# Patient Record
Sex: FEMALE | Race: WHITE | Employment: UNEMPLOYED | URBAN - METROPOLITAN AREA
[De-identification: names, ages, dates, MRNs, and addresses within clinical notes are randomized per-mention and may not be internally consistent; named-entity substitution may affect disease eponyms.]

---

## 2022-03-18 PROBLEM — Z3A.38 38 WEEKS GESTATION OF PREGNANCY: Status: ACTIVE | Noted: 2018-09-13

## 2022-03-19 PROBLEM — O43.193 MARGINAL INSERTION OF UMBILICAL CORD AFFECTING MANAGEMENT OF MOTHER IN THIRD TRIMESTER: Status: ACTIVE | Noted: 2018-05-31

## 2022-03-19 PROBLEM — O09.93 HIGH-RISK PREGNANCY IN THIRD TRIMESTER: Status: ACTIVE | Noted: 2018-05-01

## 2022-03-19 PROBLEM — O40.9XX0 POLYHYDRAMNIOS AFFECTING PREGNANCY: Status: ACTIVE | Noted: 2018-06-21

## 2022-03-19 PROBLEM — O09.899 MATERNAL VARICELLA, NON-IMMUNE: Status: ACTIVE | Noted: 2018-03-30

## 2022-03-19 PROBLEM — O30.003 TWIN GESTATION IN THIRD TRIMESTER: Status: ACTIVE | Noted: 2018-09-13

## 2022-03-19 PROBLEM — O30.043 DICHORIONIC DIAMNIOTIC TWIN PREGNANCY IN THIRD TRIMESTER: Status: ACTIVE | Noted: 2018-04-21

## 2022-03-19 PROBLEM — Z28.39 MATERNAL VARICELLA, NON-IMMUNE: Status: ACTIVE | Noted: 2018-03-30

## 2022-03-20 PROBLEM — A74.9 CHLAMYDIA INFECTION: Status: ACTIVE | Noted: 2018-04-06

## 2022-06-20 PROBLEM — Z3A.38 38 WEEKS GESTATION OF PREGNANCY: Status: RESOLVED | Noted: 2018-09-13 | Resolved: 2022-06-20

## 2022-06-20 PROBLEM — O09.899 MATERNAL VARICELLA, NON-IMMUNE: Status: RESOLVED | Noted: 2018-03-30 | Resolved: 2022-06-20

## 2022-06-20 PROBLEM — Z34.90 PREGNANCY: Status: ACTIVE | Noted: 2022-06-20

## 2022-06-20 PROBLEM — Z87.59 HISTORY OF TWIN PREGNANCY IN PRIOR PREGNANCY: Status: ACTIVE | Noted: 2022-06-20

## 2022-06-20 PROBLEM — O43.193 MARGINAL INSERTION OF UMBILICAL CORD AFFECTING MANAGEMENT OF MOTHER IN THIRD TRIMESTER: Status: RESOLVED | Noted: 2018-05-31 | Resolved: 2022-06-20

## 2022-06-20 PROBLEM — O40.9XX0 POLYHYDRAMNIOS AFFECTING PREGNANCY: Status: RESOLVED | Noted: 2018-06-21 | Resolved: 2022-06-20

## 2022-06-20 PROBLEM — O09.93 HIGH-RISK PREGNANCY IN THIRD TRIMESTER: Status: RESOLVED | Noted: 2018-05-01 | Resolved: 2022-06-20

## 2022-06-20 PROBLEM — O30.043 DICHORIONIC DIAMNIOTIC TWIN PREGNANCY IN THIRD TRIMESTER: Status: RESOLVED | Noted: 2018-04-21 | Resolved: 2022-06-20

## 2022-06-20 PROBLEM — Z98.891 HISTORY OF C-SECTION: Status: ACTIVE | Noted: 2022-06-20

## 2022-06-20 PROBLEM — Z28.39 MATERNAL VARICELLA, NON-IMMUNE: Status: RESOLVED | Noted: 2018-03-30 | Resolved: 2022-06-20

## 2022-06-20 PROBLEM — O99.210 OBESITY AFFECTING PREGNANCY: Status: ACTIVE | Noted: 2022-06-20

## 2022-06-20 PROBLEM — A74.9 CHLAMYDIA INFECTION: Status: RESOLVED | Noted: 2018-04-06 | Resolved: 2022-06-20

## 2022-06-20 PROBLEM — O30.003 TWIN GESTATION IN THIRD TRIMESTER: Status: RESOLVED | Noted: 2018-09-13 | Resolved: 2022-06-20

## 2022-06-27 PROBLEM — Z01.89 ENCOUNTER FOR LABORATORY TEST: Status: ACTIVE | Noted: 2022-06-27

## 2022-07-05 PROBLEM — Z01.89 ENCOUNTER FOR LABORATORY TEST: Status: RESOLVED | Noted: 2022-06-27 | Resolved: 2022-07-05

## 2022-07-26 PROBLEM — R87.619 ABNORMAL PAP SMEAR OF CERVIX: Status: ACTIVE | Noted: 2022-07-26

## 2022-08-22 ENCOUNTER — NURSE ONLY (OUTPATIENT)
Dept: OBGYN CLINIC | Age: 22
End: 2022-08-22

## 2022-08-22 DIAGNOSIS — Z34.82 PRENATAL CARE, SUBSEQUENT PREGNANCY, SECOND TRIMESTER: ICD-10-CM

## 2022-08-22 DIAGNOSIS — Z3A.18 18 WEEKS GESTATION OF PREGNANCY: ICD-10-CM

## 2022-08-22 DIAGNOSIS — Z13.1 SCREENING FOR DIABETES MELLITUS: ICD-10-CM

## 2022-08-22 LAB — GLUCOSE 1 HOUR: 134 MG/DL

## 2022-09-11 PROBLEM — O09.92 HIGH-RISK PREGNANCY IN SECOND TRIMESTER: Status: ACTIVE | Noted: 2022-06-20

## 2022-09-12 PROBLEM — O99.342 DEPRESSION AFFECTING PREGNANCY IN SECOND TRIMESTER, ANTEPARTUM: Status: ACTIVE | Noted: 2022-09-12

## 2022-10-31 ENCOUNTER — NURSE ONLY (OUTPATIENT)
Dept: OBGYN CLINIC | Age: 22
End: 2022-10-31

## 2022-10-31 ENCOUNTER — ROUTINE PRENATAL (OUTPATIENT)
Dept: OBGYN CLINIC | Age: 22
End: 2022-10-31
Payer: MEDICAID

## 2022-10-31 VITALS — DIASTOLIC BLOOD PRESSURE: 60 MMHG | SYSTOLIC BLOOD PRESSURE: 112 MMHG | BODY MASS INDEX: 42.6 KG/M2 | WEIGHT: 272 LBS

## 2022-10-31 DIAGNOSIS — O99.342 DEPRESSION AFFECTING PREGNANCY IN SECOND TRIMESTER, ANTEPARTUM: ICD-10-CM

## 2022-10-31 DIAGNOSIS — F32.A DEPRESSION AFFECTING PREGNANCY IN SECOND TRIMESTER, ANTEPARTUM: ICD-10-CM

## 2022-10-31 DIAGNOSIS — R73.09 ABNORMAL GTT (GLUCOSE TOLERANCE TEST): ICD-10-CM

## 2022-10-31 DIAGNOSIS — Z13.0 SCREENING FOR IRON DEFICIENCY ANEMIA: ICD-10-CM

## 2022-10-31 DIAGNOSIS — O09.92 HIGH-RISK PREGNANCY IN SECOND TRIMESTER: ICD-10-CM

## 2022-10-31 DIAGNOSIS — Z3A.28 28 WEEKS GESTATION OF PREGNANCY: ICD-10-CM

## 2022-10-31 DIAGNOSIS — O09.92 HIGH-RISK PREGNANCY IN SECOND TRIMESTER: Primary | ICD-10-CM

## 2022-10-31 DIAGNOSIS — Z13.89 SCREENING FOR GENITOURINARY CONDITION: ICD-10-CM

## 2022-10-31 LAB
GESTATIONAL 3HR GTT: NORMAL
GLUCOSE 1H P 100 G GLC PO SERPL-MCNC: 177 MG/DL (ref 65–180)
GLUCOSE 2 HOUR: 151 MG/DL (ref 65–155)
GLUCOSE P FAST SERPL-MCNC: 90 MG/DL (ref 65–95)
GLUCOSE URINE, POC: NEGATIVE
GLUCOSE, 3 HOUR: 133 MG/DL (ref 65–140)
HGB BLD-MCNC: 11.9 G/DL (ref 11.7–15.4)
PROTEIN,URINE, POC: NEGATIVE

## 2022-10-31 PROCEDURE — 99213 OFFICE O/P EST LOW 20 MIN: CPT | Performed by: OBSTETRICS & GYNECOLOGY

## 2022-11-15 ENCOUNTER — HOSPITAL ENCOUNTER (OUTPATIENT)
Age: 22
Discharge: HOME | End: 2022-11-15
Attending: OBSTETRICS & GYNECOLOGY | Admitting: OBSTETRICS & GYNECOLOGY
Payer: MEDICAID

## 2022-11-15 VITALS
DIASTOLIC BLOOD PRESSURE: 56 MMHG | SYSTOLIC BLOOD PRESSURE: 102 MMHG | RESPIRATION RATE: 16 BRPM | OXYGEN SATURATION: 97 % | TEMPERATURE: 98.1 F | HEART RATE: 92 BPM

## 2022-11-15 PROCEDURE — 6370000000 HC RX 637 (ALT 250 FOR IP): Performed by: OBSTETRICS & GYNECOLOGY

## 2022-11-15 PROCEDURE — 99283 EMERGENCY DEPT VISIT LOW MDM: CPT

## 2022-11-15 PROCEDURE — 59025 FETAL NON-STRESS TEST: CPT

## 2022-11-15 RX ORDER — ZOLPIDEM TARTRATE 5 MG/1
5 TABLET ORAL ONCE
Status: COMPLETED | OUTPATIENT
Start: 2022-11-15 | End: 2022-11-15

## 2022-11-15 RX ORDER — ACETAMINOPHEN, ASPIRIN AND CAFFEINE 250; 250; 65 MG/1; MG/1; MG/1
1 TABLET, FILM COATED ORAL EVERY 6 HOURS PRN
COMMUNITY

## 2022-11-15 RX ADMIN — ZOLPIDEM TARTRATE 5 MG: 5 TABLET ORAL at 23:22

## 2022-11-16 NOTE — ED TRIAGE NOTES
OB ED Provider Note    Name: Nadeem Hernandez MRN: [id number]     YOB: 2000  Age: 20 y.o. Sex: female      Subjective:     Reason for Presentation to [facility] ED:  high blood pressure    History of Present Illness: Ms. Aparna Childs is a 20 y.o. [id number] at 30w4d gestation with Estimated Date of Delivery: 23. Her current obstetrical history is significant for  a history of twins delivered by  . Prenatal records reviewed. Took BP at home because she had a mild HA and it was 167/100. However, they just got the BP machine and are unsure how to use it and size of cuff, etc. States HA did not improve with Excedrin. She reports good fetal movement. She denies vaginal bleeding. She denies leakage of fluid. She denies contractions. OB History    Para Term  AB Living   3 1 1 0 1 2   SAB IAB Ectopic Molar Multiple Live Births   1       1 2      # Outcome Date GA Lbr Aleks/2nd Weight Sex Delivery Anes PTL Lv   3 Current            2A Term 18 38w0d  7 lb 4.9 oz (3.315 kg) F CS-LTranv  N TRACIE   2B Term 18 38w0d  6 lb 13.9 oz (3.115 kg) M CS-LTranv  N TRACIE   1 SAB              Past Medical History:   Diagnosis Date    Allergic rhinitis     Chlamydia infection 2018    Depression affecting pregnancy in second trimester, antepartum 2022     Past Surgical History:   Procedure Laterality Date     SECTION      TONSILLECTOMY      WISDOM TOOTH EXTRACTION       Social History     Occupational History    Not on file   Tobacco Use    Smoking status: Never    Smokeless tobacco: Never   Vaping Use    Vaping Use: Never used   Substance and Sexual Activity    Alcohol use: No    Drug use: No    Sexual activity: Yes     Partners: Male     Birth control/protection: Condom        No Known Allergies  Prior to Admission medications    Medication Sig Start Date End Date Taking?  Authorizing Provider   aspirin-acetaminophen-caffeine (EXCEDRIN MIGRAINE) 598-955-12 MG per tablet Take 1 tablet by mouth every 6 hours as needed for Headaches   Yes Historical Provider, MD   sertraline (ZOLOFT) 50 MG tablet Take 1/2 pill everyday for first 6 days then take 1 whole pill everyday thereafter. 9/12/22   Alex Baires MD   aspirin 81 MG EC tablet Take 81 mg by mouth in the morning. Historical Provider, MD   Cholecalciferol (VITAMIN D3) 50 MCG (2000 UT) CAPS Take 2,000 Units by mouth in the morning. Historical Provider, MD   Prenatal-FeFum-FA-DHA w/o A (PRENATAL + DHA PO) Take by mouth    Historical Provider, MD          Objective:     Physical Exam:  VITALS:  BP (!) 102/56   Pulse 92   Temp 98.1 °F (36.7 °C) (Oral)   Resp 16   LMP 04/15/2022   SpO2 97%   BP repeatedly normal here  CONSTITUTIONAL:  awake, alert, cooperative, no apparent distress, and appears stated age  ABDOMEN:  non-tender  FHTs: Reactive and reassuring and Category I strip  Uterine activity/Contractions: None  Membranes: intact  Cervix: exam deferred     Urine dip: negative for protein    Assessment:  30w4d gestation  Normal blood pressures ; Mild headache  Reassuring fetal status      Plan:  Discharge home.  Agrees to try one dose Ambien to help with sleep in order to relieve HA

## 2022-11-16 NOTE — DISCHARGE INSTRUCTIONS
Weeks 26 to 30 of Your Pregnancy: Care Instructions  You're starting your last trimester. Delle Gottron probably feel your baby moving around more. Your back may ache as your body gets used to your baby's size and length. Take care of yourself, and pay attention to what your body needs. Talk to your doctor about getting the Tdap shot. It will help protect your  against whooping cough (pertussis). You may have [place] contractions. They are single or several strong contractions without a pattern. These are practice contractions but not the start of labor. Be kind to yourself. Take breaks when you're tired. Change positions often. Don't sit for too long or stand for too long. At work, rest during breaks if you can. If you don't get breaks, talk to your doctor about writing a letter to your employer to request them. Avoid fumes, chemicals, and tobacco smoke. Be sexual if you want to. You may be interested in sex, or you may not. Everyone is different. Sex is okay unless your doctor tells you not to. Your belly can make it hard to find good positions for sex. [place] and explore. Watch for signs of  labor. These signs include:  Menstrual-like cramps. Or you may have pain or pressure in your pelvis that happens in a pattern. About 6 or more contractions in an hour (even after rest and a glass of water). A low, dull backache that doesn't go away when you change positions. An increase or change in vaginal discharge. Your water breaking. Know what to do if you think you are having contractions. Drink 1 or 2 glasses of water. Lie down on your left side for at least an hour. While on your side, feel the top of your belly to see if it's tight. Write down your contractions for an hour. Time how long it is from the start of one contraction to the start of the next. Call your doctor if you have regular contractions.   Follow-up care is a key part of your treatment and safety. Be sure to make and go to all appointments, and call your doctor if you are having problems. It's also a good idea to know your test results and keep a list of the medicines you take. Where can you learn more? Go to https://chbrent.healthPushpay. org and sign in to your Nutrinia account. Enter A828 in the PagPop box to learn more about \"Weeks 26 to 30 of Your Pregnancy: Care Instructions. \"     If you do not have an account, please click on the \"Sign Up Now\" link. Current as of: February 23, 2022               Content Version: 13.4  © 3702-0976 Healthwise, Incorporated. Care instructions adapted under license by [facility] ([facility]). If you have questions about a medical condition or this instruction, always ask your healthcare professional. [address] any warranty or liability for your use of this information.

## 2022-11-16 NOTE — PROGRESS NOTES
Patient presents to GURU with complaint of headache unrelieved my medication, light headed,  cramping 2 times per hour and patient states that she took her blood pressure with her home cuff that she thinks there is something wrong with it and it was 167/100.

## 2022-11-16 NOTE — PROGRESS NOTES
POC urin dip results:    GLU  100mg/dl  SASHA NEG  KET NEG  SG >= 1030  BLO NEG  PH 6.0  PRO NEG  URO 0.2  NIT NEG  RICO NEG

## 2022-11-16 NOTE — PROGRESS NOTES
Darius Palacios is a 20 y.o. [id number] at 30w5d weeks gestation who is seen for  ER follow up . Pt presented to the ER 11/15/22 w/ elevated BP and Headache. Pt is having cramping today. BP in /56 and was noted to have been repeatedly normal during her evaluation there. Today she notes she still gets headaches and excedrin is minimally helpful    She has no hx PEC or elevated BP in this or prior pregnancy. She does experience some mild cramping throughout the day and some pelvic pressure. No LOF or Ucs. First bp check: 132/84  2nd BP check: 112/80    HISTORY:    OB History    Para Term  AB Living   3 1 1 0 1 2   SAB IAB Ectopic Molar Multiple Live Births   1       1 2      # Outcome Date GA Lbr Aleks/2nd Weight Sex Delivery Anes PTL Lv   3 Current            2A Term 18 38w0d  7 lb 4.9 oz (3.315 kg) F CS-LTranv  N TRACIE   2B Term 18 38w0d  6 lb 13.9 oz (3.115 kg) M CS-LTranv  N TRACIE   1 SAB                Patient's last menstrual period was 04/15/2022. ROS:  Feeling well. No dyspnea or chest pain on exertion. No abdominal pain, change in bowel habits, black or bloody stools. No urinary tract symptoms. OB ROS: No contractions, shortness of breath, vaginal bleeding , and vaginal leaking of fluid . PHYSICAL EXAM:  Blood pressure 112/80, weight 275 lb 6.4 oz (124.9 kg), last menstrual period 04/15/2022. The patient appears well, alert, oriented x 3. Full exam deferred    ASSESSMENT:  Encounter Diagnoses   Name Primary? High-risk pregnancy in third trimester     Screening for genitourinary condition     30 weeks gestation of pregnancy     Pregnancy headache in third trimester Yes       PLAN:  Reassured pt of wnl BP today and in ED during evaluation  Can try magnesium supplement 300mg bid to see if helps with HA  PEC precautions given, will call for re-evaluation prn  Increase water intake. FU as scheduled.     Orders Placed This Encounter   Procedures    AMB POC URINALYSIS DIP STICK AUTO W/O MICRO     Patient reassured. Supervising physician is Dr. Maggi Castellanos. Greater than 50% of this 20 minute visit is spent in counseling to the above topics.

## 2022-11-17 ENCOUNTER — ROUTINE PRENATAL (OUTPATIENT)
Dept: OBGYN CLINIC | Age: 22
End: 2022-11-17
Payer: MEDICAID

## 2022-11-17 VITALS — WEIGHT: 275.4 LBS | DIASTOLIC BLOOD PRESSURE: 80 MMHG | BODY MASS INDEX: 43.13 KG/M2 | SYSTOLIC BLOOD PRESSURE: 112 MMHG

## 2022-11-17 DIAGNOSIS — O09.93 HIGH-RISK PREGNANCY IN THIRD TRIMESTER: ICD-10-CM

## 2022-11-17 DIAGNOSIS — R51.9 PREGNANCY HEADACHE IN THIRD TRIMESTER: Primary | ICD-10-CM

## 2022-11-17 DIAGNOSIS — O26.893 PREGNANCY HEADACHE IN THIRD TRIMESTER: Primary | ICD-10-CM

## 2022-11-17 DIAGNOSIS — Z3A.30 30 WEEKS GESTATION OF PREGNANCY: ICD-10-CM

## 2022-11-17 DIAGNOSIS — Z13.89 SCREENING FOR GENITOURINARY CONDITION: ICD-10-CM

## 2022-11-17 PROBLEM — R00.0 TACHYCARDIA: Status: ACTIVE | Noted: 2022-11-11

## 2022-11-17 PROBLEM — O26.899 ABDOMINAL PAIN AFFECTING PREGNANCY: Status: ACTIVE | Noted: 2022-11-11

## 2022-11-17 PROBLEM — R81 GLUCOSURIA: Status: ACTIVE | Noted: 2022-11-11

## 2022-11-17 PROBLEM — R10.9 ABDOMINAL PAIN AFFECTING PREGNANCY: Status: ACTIVE | Noted: 2022-11-11

## 2022-11-17 LAB
BILIRUBIN, URINE, POC: NEGATIVE
BLOOD URINE, POC: NEGATIVE
GLUCOSE URINE, POC: NORMAL
KETONES, URINE, POC: NEGATIVE
LEUKOCYTE ESTERASE, URINE, POC: NORMAL
NITRITE, URINE, POC: NEGATIVE
PH, URINE, POC: 5.5 (ref 4.6–8)
PROTEIN,URINE, POC: NEGATIVE
SPECIFIC GRAVITY, URINE, POC: 1.03 (ref 1–1.03)
URINALYSIS CLARITY, POC: CLEAR
URINALYSIS COLOR, POC: YELLOW
UROBILINOGEN, POC: NORMAL

## 2022-11-17 PROCEDURE — 99213 OFFICE O/P EST LOW 20 MIN: CPT | Performed by: NURSE PRACTITIONER

## 2022-11-29 ENCOUNTER — ROUTINE PRENATAL (OUTPATIENT)
Dept: OBGYN CLINIC | Age: 22
End: 2022-11-29
Payer: MEDICAID

## 2022-11-29 VITALS — BODY MASS INDEX: 43.42 KG/M2 | SYSTOLIC BLOOD PRESSURE: 116 MMHG | DIASTOLIC BLOOD PRESSURE: 70 MMHG | WEIGHT: 277.2 LBS

## 2022-11-29 DIAGNOSIS — O09.92 HIGH-RISK PREGNANCY IN SECOND TRIMESTER: ICD-10-CM

## 2022-11-29 DIAGNOSIS — Z13.89 SCREENING FOR GENITOURINARY CONDITION: ICD-10-CM

## 2022-11-29 DIAGNOSIS — Z34.83 ENCOUNTER FOR SUPERVISION OF NORMAL PREGNANCY IN MULTIGRAVIDA IN THIRD TRIMESTER: Primary | ICD-10-CM

## 2022-11-29 DIAGNOSIS — Z3A.32 32 WEEKS GESTATION OF PREGNANCY: ICD-10-CM

## 2022-11-29 LAB
GLUCOSE URINE, POC: NORMAL
PROTEIN,URINE, POC: NEGATIVE

## 2022-11-29 PROCEDURE — 90715 TDAP VACCINE 7 YRS/> IM: CPT | Performed by: NURSE PRACTITIONER

## 2022-11-29 PROCEDURE — 99213 OFFICE O/P EST LOW 20 MIN: CPT | Performed by: NURSE PRACTITIONER

## 2022-11-29 PROCEDURE — 90471 IMMUNIZATION ADMIN: CPT | Performed by: NURSE PRACTITIONER

## 2022-12-05 ENCOUNTER — HOSPITAL ENCOUNTER (OUTPATIENT)
Age: 22
Discharge: HOME | End: 2022-12-05
Attending: OBSTETRICS & GYNECOLOGY | Admitting: OBSTETRICS & GYNECOLOGY
Payer: MEDICAID

## 2022-12-05 VITALS
RESPIRATION RATE: 18 BRPM | TEMPERATURE: 97.6 F | OXYGEN SATURATION: 98 % | BODY MASS INDEX: 43.47 KG/M2 | WEIGHT: 277 LBS | HEART RATE: 107 BPM | SYSTOLIC BLOOD PRESSURE: 128 MMHG | HEIGHT: 67 IN | DIASTOLIC BLOOD PRESSURE: 74 MMHG

## 2022-12-05 PROCEDURE — 96372 THER/PROPH/DIAG INJ SC/IM: CPT

## 2022-12-05 PROCEDURE — 99285 EMERGENCY DEPT VISIT HI MDM: CPT

## 2022-12-05 PROCEDURE — 6360000002 HC RX W HCPCS: Performed by: OBSTETRICS & GYNECOLOGY

## 2022-12-05 PROCEDURE — 59025 FETAL NON-STRESS TEST: CPT

## 2022-12-05 PROCEDURE — 6370000000 HC RX 637 (ALT 250 FOR IP): Performed by: OBSTETRICS & GYNECOLOGY

## 2022-12-05 RX ORDER — CYCLOBENZAPRINE HCL 10 MG
10 TABLET ORAL 3 TIMES DAILY PRN
Status: DISCONTINUED | OUTPATIENT
Start: 2022-12-05 | End: 2022-12-06 | Stop reason: HOSPADM

## 2022-12-05 RX ORDER — TERBUTALINE SULFATE 1 MG/ML
0.25 INJECTION, SOLUTION SUBCUTANEOUS ONCE
Status: COMPLETED | OUTPATIENT
Start: 2022-12-05 | End: 2022-12-05

## 2022-12-05 RX ORDER — TERBUTALINE SULFATE 1 MG/ML
INJECTION, SOLUTION SUBCUTANEOUS
Status: DISCONTINUED
Start: 2022-12-05 | End: 2022-12-06 | Stop reason: HOSPADM

## 2022-12-05 RX ADMIN — CYCLOBENZAPRINE 10 MG: 10 TABLET, FILM COATED ORAL at 22:36

## 2022-12-05 RX ADMIN — TERBUTALINE SULFATE 0.25 MG: 1 INJECTION, SOLUTION SUBCUTANEOUS at 23:14

## 2022-12-06 NOTE — PROGRESS NOTES
Pt reports feeling much better. Precautions given. Left ambulatory with sig other. To F/U as previously scheduled.

## 2022-12-06 NOTE — DISCHARGE INSTRUCTIONS
Activity as tolerated  No heavy lifting, pushing or pulling  Rest and sleep on your side  Continue a regular diet with 8-10 glasses of water a day    Call your provider if any of the following:  Excessive vaginal bleeding like a period  Your water breaks  Contractions:           -Greater than 37 weeks: every 4-5 min lasting 40-60 seconds for 1-2 hours          -Less than 37 weeks: 6 or more contractions per hour that do not improve with rest and hydration  4. Decreased fetal movement: your baby should move 10 times in a 2 hour period  5. Severe pain      Weeks 32 to 34 of Your Pregnancy: Care Instructions  Decide whether you want to bank or donate your baby's umbilical cord blood. If you want to save this blood, you have to arrange for it ahead of time. Decide about circumcision. Personal, Mu-ism, or cultural beliefs may play a role in your decision. You get to decide what you want for your baby. Learn how to ease hemorrhoids. Get more liquids, fruits, vegetables, and fiber in your diet. Avoid sitting for too long. Clean yourself with moist toilet paper. Or try witch hazel pads. Try ice packs or warm sitz baths for discomfort. Use hydrocortisone cream for pain or itching. Ask your doctor about stool softeners. Consider the benefits of breastfeeding. It reduces your baby's risk of sudden infant death syndrome (SIDS).  babies are less likely to get certain infections. And they're less likely to be obese or get diabetes later in life. It can lower your risk of breast and ovarian cancers and osteoporosis. It saves you money. Follow-up care is a key part of your treatment and safety. Be sure to make and go to all appointments, and call your doctor if you are having problems. It's also a good idea to know your test results and keep a list of the medicines you take. Where can you learn more? Go to https://chnoreeneb.health-partners. org and sign in to your SLIC games account.  Enter Z223 in the Search Health Information box to learn more about \"Weeks 32 to 34 of Your Pregnancy: Care Instructions. \"     If you do not have an account, please click on the \"Sign Up Now\" link. Current as of: February 23, 2022               Content Version: 13.4  © 9020-4558 Healthwise, Incorporated. Care instructions adapted under license by [facility] ([facility]). If you have questions about a medical condition or this instruction, always ask your healthcare professional. [address] any warranty or liability for your use of this information.

## 2022-12-06 NOTE — PROGRESS NOTES
Pt to [facility] from [facility] to be evaluated for abdominal cramping all throughout the day. Dr Rayray Cannon aware. EFM applied.    UA as follows:  GLU neg  SASHA neg  KET 80  SG >=1.030  BLO neg  PRO trace  URO 0.2  NIT neg  RICO neg

## 2022-12-14 ENCOUNTER — ROUTINE PRENATAL (OUTPATIENT)
Dept: OBGYN CLINIC | Age: 22
End: 2022-12-14
Payer: MEDICAID

## 2022-12-14 VITALS — WEIGHT: 279.6 LBS | DIASTOLIC BLOOD PRESSURE: 64 MMHG | BODY MASS INDEX: 43.79 KG/M2 | SYSTOLIC BLOOD PRESSURE: 126 MMHG

## 2022-12-14 DIAGNOSIS — Z13.89 SCREENING FOR GENITOURINARY CONDITION: ICD-10-CM

## 2022-12-14 DIAGNOSIS — Z34.83 PRENATAL CARE, SUBSEQUENT PREGNANCY, THIRD TRIMESTER: Primary | ICD-10-CM

## 2022-12-14 DIAGNOSIS — Z3A.34 34 WEEKS GESTATION OF PREGNANCY: ICD-10-CM

## 2022-12-14 LAB
GLUCOSE URINE, POC: NORMAL
PROTEIN,URINE, POC: NEGATIVE

## 2022-12-14 PROCEDURE — 99213 OFFICE O/P EST LOW 20 MIN: CPT | Performed by: OBSTETRICS & GYNECOLOGY

## 2022-12-20 ENCOUNTER — ROUTINE PRENATAL (OUTPATIENT)
Dept: OBGYN CLINIC | Age: 22
End: 2022-12-20
Payer: MEDICAID

## 2022-12-20 VITALS — DIASTOLIC BLOOD PRESSURE: 82 MMHG | SYSTOLIC BLOOD PRESSURE: 126 MMHG | WEIGHT: 285.4 LBS | BODY MASS INDEX: 44.7 KG/M2

## 2022-12-20 DIAGNOSIS — Z3A.35 35 WEEKS GESTATION OF PREGNANCY: ICD-10-CM

## 2022-12-20 DIAGNOSIS — N85.8 UTERINE IRRITABILITY: ICD-10-CM

## 2022-12-20 DIAGNOSIS — Z13.89 SCREENING FOR GENITOURINARY CONDITION: Primary | ICD-10-CM

## 2022-12-20 DIAGNOSIS — R10.2 PELVIC PAIN: ICD-10-CM

## 2022-12-20 DIAGNOSIS — R11.0 NAUSEA: ICD-10-CM

## 2022-12-20 LAB
GLUCOSE URINE, POC: NORMAL
PROTEIN,URINE, POC: NEGATIVE

## 2022-12-20 PROCEDURE — 99214 OFFICE O/P EST MOD 30 MIN: CPT | Performed by: NURSE PRACTITIONER

## 2022-12-20 PROCEDURE — 81002 URINALYSIS NONAUTO W/O SCOPE: CPT | Performed by: NURSE PRACTITIONER

## 2022-12-20 RX ORDER — ONDANSETRON 4 MG/1
4 TABLET, FILM COATED ORAL 3 TIMES DAILY PRN
Qty: 20 TABLET | Refills: 1 | Status: SHIPPED | OUTPATIENT
Start: 2022-12-20

## 2022-12-20 NOTE — PROGRESS NOTES
Olena Thomson is a 20 y.o. S2K1331 at 35w4d  who is seen for eval possible contractions. Patient states contractions are not close to one another. States at one point thought may have had, x3 in 10 minutes, but would stop for an hour and start again. Denies VB. Denies LOF. Denies fevers/chills. Does admit to having nausea and vomiting. A bit of a thick/sticky discharge, but states this is normal for her. No diarrhea. Zofran rx and NST offered. HISTORY:    OB History          3    Para   1    Term   1       0    AB   1    Living   2         SAB   1    IAB        Ectopic        Molar        Multiple   1    Live Births   2               Patient's last menstrual period was 04/15/2022. Social History     Substance and Sexual Activity   Sexual Activity Yes    Partners: Male    Birth control/protection: None        ROS:  Feeling well. No dyspnea or chest pain on exertion. No abdominal pain, change in bowel habits, black or bloody stools. No urinary tract symptoms. OB ROS: No chest pain, fever, headache , pelvic pressure, right upper quadrant pain  , shortness of breath, swelling, vaginal bleeding , vaginal leaking of fluid , and visual disturbances. PHYSICAL EXAM:  Blood pressure 126/82, weight 285 lb 6.4 oz (129.5 kg), last menstrual period 04/15/2022. The patient appears well, alert, oriented x 3. Lungs are clear. Heart RRR, no murmurs. ASSESSMENT:  Encounter Diagnoses   Name Primary? Screening for genitourinary condition Yes    35 weeks gestation of pregnancy     Uterine irritability     Nausea     Pelvic pain        PLAN:     Diagnosis Orders   1. Screening for genitourinary condition  AMB POC OB URINE DIP      2. 35 weeks gestation of pregnancy  AMB POC OB URINE DIP      3. Uterine irritability        4. Nausea        5. Pelvic pain              Patient reassured.     Reactive NST   Baseline 150s  + accells   No decels  Uterine irritability, but no consistent contractions  Cx soft/thick/fingertip (confirmed by DA, [facility])  PTL precautions  Rest/Hydrate  Keep next scheduled OB visit    Supervising physician is Dr. Lissa Dobbins. Greater than 50% of this 30 minute visit was spent in counseling to the above topics.       Sushant Bernard, APRN - CNP

## 2022-12-27 ENCOUNTER — TELEPHONE (OUTPATIENT)
Dept: OBGYN CLINIC | Age: 22
End: 2022-12-27

## 2022-12-27 NOTE — TELEPHONE ENCOUNTER
Pt called with c/o pelvic pain when standing, sitting, and she is unable to get comfortable. Pt states that she is having thick discharge, is unsure if she has had LOF. Advised pt to put on a panty liner. If the panty liner is damp after 1 hour present to [facility] for evaluation. She reports +FM. She is advised to try using a heating pad only on the lowest setting and only on her lower back, increase hydration and take 2 extra strength tylenol. She is advised if no relief of symptoms after trying the above measures to present to [facility] for evaluation. She voiced full understanding and is aware. All questions answered and pt advised to call back PRN.

## 2022-12-30 ENCOUNTER — ROUTINE PRENATAL (OUTPATIENT)
Dept: OBGYN CLINIC | Age: 22
End: 2022-12-30
Payer: MEDICAID

## 2022-12-30 VITALS — DIASTOLIC BLOOD PRESSURE: 72 MMHG | WEIGHT: 283.4 LBS | BODY MASS INDEX: 44.39 KG/M2 | SYSTOLIC BLOOD PRESSURE: 126 MMHG

## 2022-12-30 DIAGNOSIS — O09.93 HIGH-RISK PREGNANCY IN THIRD TRIMESTER: Primary | ICD-10-CM

## 2022-12-30 DIAGNOSIS — Z36.85 ANTENATAL SCREENING FOR STREPTOCOCCUS B: ICD-10-CM

## 2022-12-30 DIAGNOSIS — O99.213 OBESITY AFFECTING PREGNANCY IN THIRD TRIMESTER: ICD-10-CM

## 2022-12-30 DIAGNOSIS — Z3A.37 37 WEEKS GESTATION OF PREGNANCY: ICD-10-CM

## 2022-12-30 DIAGNOSIS — Z13.89 SCREENING FOR GENITOURINARY CONDITION: ICD-10-CM

## 2022-12-30 LAB
GLUCOSE URINE, POC: NORMAL
PROTEIN,URINE, POC: NEGATIVE

## 2022-12-30 PROCEDURE — 99213 OFFICE O/P EST LOW 20 MIN: CPT | Performed by: OBSTETRICS & GYNECOLOGY

## 2023-01-01 LAB
BACTERIA SPEC CULT: NORMAL
SERVICE CMNT-IMP: NORMAL

## 2023-01-06 ENCOUNTER — ROUTINE PRENATAL (OUTPATIENT)
Dept: OBGYN CLINIC | Age: 23
End: 2023-01-06

## 2023-01-06 VITALS — BODY MASS INDEX: 44.29 KG/M2 | DIASTOLIC BLOOD PRESSURE: 74 MMHG | WEIGHT: 282.8 LBS | SYSTOLIC BLOOD PRESSURE: 121 MMHG

## 2023-01-06 DIAGNOSIS — O09.92 HIGH-RISK PREGNANCY IN SECOND TRIMESTER: ICD-10-CM

## 2023-01-06 DIAGNOSIS — Z34.93 PRENATAL CARE, THIRD TRIMESTER: Primary | ICD-10-CM

## 2023-01-06 DIAGNOSIS — Z3A.38 38 WEEKS GESTATION OF PREGNANCY: ICD-10-CM

## 2023-01-06 DIAGNOSIS — O99.342 DEPRESSION AFFECTING PREGNANCY IN SECOND TRIMESTER, ANTEPARTUM: ICD-10-CM

## 2023-01-06 DIAGNOSIS — Z87.59 HISTORY OF TWIN PREGNANCY IN PRIOR PREGNANCY: ICD-10-CM

## 2023-01-06 DIAGNOSIS — Z98.891 HISTORY OF C-SECTION: ICD-10-CM

## 2023-01-06 DIAGNOSIS — F32.A DEPRESSION AFFECTING PREGNANCY IN SECOND TRIMESTER, ANTEPARTUM: ICD-10-CM

## 2023-01-06 DIAGNOSIS — Z13.89 SCREENING FOR GENITOURINARY CONDITION: ICD-10-CM

## 2023-01-06 DIAGNOSIS — O99.213 OBESITY AFFECTING PREGNANCY IN THIRD TRIMESTER: ICD-10-CM

## 2023-01-06 LAB
GLUCOSE URINE, POC: NORMAL
PROTEIN,URINE, POC: NEGATIVE

## 2023-01-06 NOTE — PROGRESS NOTES
20 y.o. [id number] at 38w0d  who is here for routine OB visit. Pt reports she is scheduled for  next Friday but unsure about time. Not scheduled, will schedule ASAP for next weekend and call pt today. RTO 2w PP visit. Stop ASA. HISTORY:  OB History    Para Term  AB Living   3 1 1 0 1 2   SAB IAB Ectopic Molar Multiple Live Births   1       1 2      # Outcome Date GA Lbr Aleks/2nd Weight Sex Delivery Anes PTL Lv   3 Current            2A Term 18 38w0d  7 lb 4.9 oz (3.315 kg) F CS-LTranv  N TRACIE   2B Term 18 38w0d  6 lb 13.9 oz (3.115 kg) M CS-LTranv  N TRACIE   1 SAB               Patient's last menstrual period was 04/15/2022. Social History     Substance and Sexual Activity   Sexual Activity Yes    Partners: Male    Birth control/protection: None      Past Medical History:   Diagnosis Date    Allergic rhinitis     Chlamydia infection 2018    Depression affecting pregnancy in second trimester, antepartum 2022      Past Surgical History:   Procedure Laterality Date     SECTION      TONSILLECTOMY      WISDOM TOOTH EXTRACTION         ROS:  Feeling well. No dyspnea or chest pain on exertion. No abdominal pain, change in bowel habits, black or bloody stools. No urinary tract symptoms. OB ROS: No vaginal bleeding, cxns, LOF, decreased FM. No HA, vision changes, abdominal pain. PHYSICAL EXAM:  /74   Wt 282 lb 12.8 oz (128.3 kg)   LMP 04/15/2022   BMI 44.29 kg/m²        ASSESSMENT / PLAN:  1. Prenatal care, third trimester  -     AMB POC OB URINE DIP  2. Screening for genitourinary condition  -     AMB POC OB URINE DIP  3. 38 weeks gestation of pregnancy  -     AMB POC OB URINE DIP  4. Depression affecting pregnancy in second trimester, antepartum  Overview:  [facility] at [facility]: Discussed results of depression screen today. Pt denies fighting with  or financial concerns. Reports trouble sleeping. Denies SI/HI.   Would like to start Zoloft. · Zoloft 50mg po sent in to pharmacy  5. High-risk pregnancy in second trimester  Overview:  NIPT-low risk  COVID-not vaccinated  Flu-  Tdap- 22 at [facility]: Normal anatomy/echo, AC 91 %, JENNY WNL, Negative NIPT    · No F/U MFM-refer back as needed  6. History of   Overview:  Desires repeat. 7. History of twin pregnancy in prior pregnancy  Overview:  Carried to term (38wks). 8. Obesity affecting pregnancy in third trimester  Overview:  BMI=42    18 wk GTT-134  3 hr GTT-WNL    28 wk 3 hr GTT- WNL    Start  mg and Vit D 200 iu at 12 weeks.          Kristie Mullins MD

## 2023-01-11 ENCOUNTER — HOSPITAL ENCOUNTER (OUTPATIENT)
Age: 23
Discharge: HOME | End: 2023-01-11
Attending: OBSTETRICS & GYNECOLOGY | Admitting: OBSTETRICS & GYNECOLOGY
Payer: MEDICAID

## 2023-01-11 VITALS
DIASTOLIC BLOOD PRESSURE: 90 MMHG | OXYGEN SATURATION: 98 % | RESPIRATION RATE: 16 BRPM | HEART RATE: 100 BPM | SYSTOLIC BLOOD PRESSURE: 131 MMHG | TEMPERATURE: 98.3 F

## 2023-01-11 PROBLEM — N89.8 VAGINAL DISCHARGE DURING PREGNANCY IN THIRD TRIMESTER: Status: ACTIVE | Noted: 2023-01-11

## 2023-01-11 PROBLEM — O26.893 VAGINAL DISCHARGE DURING PREGNANCY IN THIRD TRIMESTER: Status: ACTIVE | Noted: 2023-01-11

## 2023-01-11 LAB
AMNISURE, POC: NEGATIVE
Lab: NORMAL
NEGATIVE QC PASS/FAIL: NORMAL
POSITIVE QC PASS/FAIL: NORMAL

## 2023-01-11 PROCEDURE — 99283 EMERGENCY DEPT VISIT LOW MDM: CPT

## 2023-01-11 PROCEDURE — 6370000000 HC RX 637 (ALT 250 FOR IP): Performed by: OBSTETRICS & GYNECOLOGY

## 2023-01-11 RX ORDER — OXYCODONE HYDROCHLORIDE AND ACETAMINOPHEN 5; 325 MG/1; MG/1
1 TABLET ORAL
Status: COMPLETED | OUTPATIENT
Start: 2023-01-11 | End: 2023-01-11

## 2023-01-11 RX ORDER — PROMETHAZINE HYDROCHLORIDE 25 MG/1
25 TABLET ORAL
Status: COMPLETED | OUTPATIENT
Start: 2023-01-11 | End: 2023-01-11

## 2023-01-11 RX ADMIN — PROMETHAZINE HYDROCHLORIDE 25 MG: 25 TABLET ORAL at 20:40

## 2023-01-11 RX ADMIN — OXYCODONE AND ACETAMINOPHEN 1 TABLET: 5; 325 TABLET ORAL at 20:40

## 2023-01-11 ASSESSMENT — PAIN SCALES - GENERAL: PAINLEVEL_OUTOF10: 8

## 2023-01-12 ENCOUNTER — ANESTHESIA EVENT (OUTPATIENT)
Dept: SURGERY | Age: 23
End: 2023-01-12
Payer: MEDICAID

## 2023-01-12 NOTE — ED NOTES
CC: vaginal discharge      20 y.o. female [id number] at 38w5d  weeks gestation who requests evaluation for possible srom. Had small gush this afternoon while cleaning, none since. Is having cramping 8/10. No bleeding or ha. Her pregnancy issues include: depression, prior c/s for twins to have repeat.      Fetal movement has beennormal .    HISTORY:  OB History    Para Term  AB Living   3 1 1 0 1 2   SAB IAB Ectopic Molar Multiple Live Births   1       1 2      # Outcome Date GA Lbr Aleks/2nd Weight Sex Delivery Anes PTL Lv   3 Current            2A Term 18 38w0d  7 lb 4.9 oz (3.315 kg) F CS-LTranv  N TRACIE   2B Term 18 38w0d  6 lb 13.9 oz (3.115 kg) M CS-LTranv  N TRACIE   1 SAB                Past Surgical History:   Procedure Laterality Date     SECTION      TONSILLECTOMY      WISDOM TOOTH EXTRACTION         Past Medical History:   Diagnosis Date    Allergic rhinitis     Chlamydia infection 2018    Depression affecting pregnancy in second trimester, antepartum 2022       No Known Allergies    Family History   Problem Relation Age of Onset    Prostate Cancer Maternal Grandfather     COPD Maternal Grandmother     Bleeding Prob Maternal Grandmother     No Known Problems Father     Other Mother         scoliosis       Social History     Socioeconomic History    Marital status:      Spouse name: Not on file    Number of children: Not on file    Years of education: Not on file    Highest education level: Not on file   Occupational History    Not on file   Tobacco Use    Smoking status: Never    Smokeless tobacco: Never   Vaping Use    Vaping Use: Never used   Substance and Sexual Activity    Alcohol use: No    Drug use: No    Sexual activity: Yes     Partners: Male     Birth control/protection: None   Other Topics Concern    Not on file   Social History Narrative    Not on file     Social Determinants of Health     Financial Resource Strain: Not on file   Food Insecurity: Not on file   Transportation Needs: Not on file   Physical Activity: Not on file   Stress: Not on file   Social Connections: Not on file   Intimate Partner Violence: Not on file   Housing Stability: Not on file       ROS:  Negative:   negative 10 point ROS except as noted in HPI    Positive:   per hpi    PHYSICAL EXAM:  Blood pressure (!) 131/90, pulse 100, temperature 98.3 °F (36.8 °C), temperature source Axillary, resp. rate 16, last menstrual period 04/15/2022, SpO2 98 %. The patient appears well, alert, oriented x 3. Appropriate affect. Lungs are clear. Heart RRR, no murmurs. Abdomen soft, non-tender, no rebound/guarding, normoactive bs. Fundus soft and non tender  Skin warm, dry, no rashes  Ext tr edema, DTR's normal    Cervix: 2 cm dilated  cervix very firm  20% effaced    -2 station    Presenting Part: cephalic--no change in 1 hour    Fetal Heart Rate: Reactive  Baseline: 140 per minute  Variability: moderate  Accelerations: yes  Decelerations: none  Uterine contractions: regular, every 3 minutes   I personally reviewed and interpreted the FHR tracing    Recent Results (from the past 24 hour(s))   POC AmniSure    Collection Time: 01/11/23  7:25 AM   Result Value Ref Range    Amnisure, POC Negative Negative    Lot Number [id number]     Positive QC Pass/Fail Acceptable     Negative QC Pass/Fail Acceptable          I have personally reviewed the patient's history, prenatal record, and pertinent test results. vital sign trends, radiology reports, laboratory results, and previous provider notes support my clinical impression. Assessment:  20 y.o. female at 44w7d  Uterine contractions without cervix change. No srom  Reassuring fetal status    Plan:  Findings and test results were discussed. Labor instructions reviewed. Discharge home  Requested pain meds, given at discharge.    Time spent with patient consistent with level of care    Signed By:  Nikolai Phillips MD     January 11, 2023

## 2023-01-12 NOTE — DISCHARGE INSTRUCTIONS
PLEASE FOLLOW-UP WITH PRIMARY OB AT NEXT SCHEDULED VISIT. RETURN TO A GURU IF EXPERIENCING CONTRACTIONS THAT BECOME STRONGER AND CLOSER TOGETHER, VAGINAL BLEEDING, LEAKING OF FLUIDS, OR DECREASED FETAL MOVEMENT. Pregnancy Precautions: Care Instructions  Your Care Instructions     There is no sure way to prevent labor before your due date ( labor) or to prevent most other pregnancy problems. But there are things you can do to increase your chances of a healthy pregnancy. Go to your appointments, follow your doctor's advice, and take good care of yourself. Eat well, and exercise (if your doctor agrees). And make sure to drink plenty of water. Follow-up care is a key part of your treatment and safety. Be sure to make and go to all appointments, and call your doctor if you are having problems. It's also a good idea to know your test results and keep a list of the medicines you take. How can you care for yourself at home? Make sure you go to your prenatal appointments. At each visit, your doctor will check your blood pressure. Your doctor will also check to see if you have protein in your urine. High blood pressure and protein in urine are signs of preeclampsia. This condition can be dangerous for you and your baby. Drink plenty of fluids. Dehydration can cause contractions. If you have kidney, heart, or liver disease and have to limit fluids, talk with your doctor before you increase the amount of fluids you drink. Tell your doctor right away if you notice any symptoms of an infection, such as:  Burning when you urinate. A foul-smelling discharge from your vagina. Vaginal itching. Unexplained fever. Unusual pain or soreness in your uterus or lower belly. Eat a balanced diet. Include plenty of foods that are high in calcium and iron. Foods high in calcium include milk, cheese, yogurt, almonds, and broccoli.   Foods high in iron include red meat, shellfish, poultry, eggs, beans, raisins, whole-grain bread, and leafy green vegetables. Do not smoke. If you need help quitting, talk to your doctor about stop-smoking programs and medicines. These can increase your chances of quitting for good. Do not drink alcohol or use marijuana or illegal drugs. Follow your doctor's directions about activity. Your doctor will let you know how much, if any, exercise you can do. Ask your doctor if you can have sex. If you are at risk for early labor, your doctor may ask you to not have sex. Take care to prevent falls. During pregnancy, your joints are loose, and your balance is off. Sports such as bicycling, skiing, or in-line skating can increase your risk of falling. And don't ride horses or motorcycles, dive, water ski, scuba dive, or parachute jump while you are pregnant. Avoid things that can make your body too hot and may be harmful to your baby, such as a hot tub or sauna. Or talk with your doctor before doing anything that raises your body temperature. Your doctor can tell you if it's safe. Do not take any over-the-counter or herbal medicines or supplements without talking to your doctor or pharmacist first.  When should you call for help? Call 911  anytime you think you may need emergency care. For example, call if:    You passed out (lost consciousness). You have a seizure. You have severe vaginal bleeding. You have severe pain in your belly or pelvis. You have had fluid gushing or leaking from your vagina and you know or think the umbilical cord is bulging into your vagina. If this happens, immediately get down on your knees so your rear end (buttocks) is higher than your head. This will decrease the pressure on the cord until help arrives. Call your doctor now or seek immediate medical care if:    You have signs of preeclampsia, such as:  Sudden swelling of your face, hands, or feet. New vision problems (such as dimness, blurring, or seeing spots). A severe headache.      You have any vaginal bleeding. You have belly pain or cramping. You have a fever. You have had regular contractions (with or without pain) for an hour. This means that you have 8 or more within 1 hour or 4 or more in 20 minutes after you change your position and drink fluids. You have a sudden release of fluid from your vagina. You have low back pain or pelvic pressure that does not go away. You notice that your baby has stopped moving or is moving much less than normal.   Watch closely for changes in your health, and be sure to contact your doctor if you have any problems. Where can you learn more? Go to http://www.woods.com/ and enter Y951 to learn more about \"Pregnancy Precautions: Care Instructions. \"  Current as of: February 23, 2022               Content Version: 13.5  © 6550-2567 [employer]. Care instructions adapted under license by [facility] ([facility]). If you have questions about a medical condition or this instruction, always ask your healthcare professional. [place] [address] any warranty or liability for your use of this information. Counting Your Baby's Kicks: Care Instructions  Overview     Counting your baby's kicks is one way your doctor can tell that your baby is healthy. Most women--especially in a first pregnancy--feel their baby move for the first time between 16 and 22 weeks. The movement may feel like flutters rather than kicks. Your baby may move more at certain times of the day. When you are active, you may notice less kicking than when you are resting. At your prenatal visits, your doctor will ask whether the baby is active. In your last trimester, your doctor may ask you to count the number of times you feel your baby move. Follow-up care is a key part of your treatment and safety. Be sure to make and go to all appointments, and call your doctor if you are having problems.  It's also a good idea to know your test results and keep a list of the medicines you take. How do you count fetal kicks? A common method of checking your baby's movement is to note the length of time it takes to count ten movements (such as kicks, flutters, or rolls). Pick your baby's most active time of day to count. This may be any time from morning to evening. If you don't feel 10 movements in an hour, have something to eat or drink and count for another hour. If you don't feel at least 10 movements in the 2-hour period, call your doctor. When should you call for help? Call your doctor now or seek immediate medical care if:    You noticed that your baby has stopped moving or is moving much less than normal.   Watch closely for changes in your health, and be sure to contact your doctor if you have any problems. Where can you learn more? Go to http://www.woods.com/ and enter U048 to learn more about \"Counting Your Baby's Kicks: Care Instructions. \"  Current as of: February 23, 2022               Content Version: 13.5  © 0430-5276 [employer]. Care instructions adapted under license by [facility] ([facility]). If you have questions about a medical condition or this instruction, always ask your healthcare professional. [address] any warranty or liability for your use of this information.

## 2023-01-12 NOTE — PROGRESS NOTES
Discharge orders received from MD. Discharge instructions given to pt and pt verbalized understanding. D/c home accompanied by .

## 2023-01-13 ENCOUNTER — HOSPITAL ENCOUNTER (INPATIENT)
Age: 23
LOS: 2 days | Discharge: HOME | End: 2023-01-15
Attending: OBSTETRICS & GYNECOLOGY | Admitting: OBSTETRICS & GYNECOLOGY
Payer: MEDICAID

## 2023-01-13 ENCOUNTER — ANESTHESIA (OUTPATIENT)
Dept: SURGERY | Age: 23
End: 2023-01-13
Payer: MEDICAID

## 2023-01-13 PROBLEM — Z3A.39 39 WEEKS GESTATION OF PREGNANCY: Status: ACTIVE | Noted: 2023-01-13

## 2023-01-13 LAB
ABO + RH BLD: NORMAL
BASE DEFICIT BLD-SCNC: 0.7 MMOL/L
BASE DEFICIT BLD-SCNC: 0.8 MMOL/L
BLOOD GROUP ANTIBODIES SERPL: NORMAL
ERYTHROCYTE [DISTWIDTH] IN BLOOD BY AUTOMATED COUNT: 14.4 % (ref 11.9–14.6)
HCO3 BLD-SCNC: 25.1 MMOL/L (ref 22–26)
HCO3 BLDV-SCNC: 23.8 MMOL/L (ref 23–28)
HCT VFR BLD AUTO: 37.5 % (ref 35.8–46.3)
HGB BLD-MCNC: 12 G/DL (ref 11.7–15.4)
MCH RBC QN AUTO: 25.3 PG (ref 26.1–32.9)
MCHC RBC AUTO-ENTMCNC: 32 G/DL (ref 31.4–35)
MCV RBC AUTO: 79.1 FL (ref 82–102)
NRBC # BLD: 0 K/UL (ref 0–0.2)
PCO2 BLDCO: 38 MMHG (ref 32–68)
PCO2 BLDCO: 45 MMHG (ref 32–68)
PH BLDCO: 7.36 (ref 7.15–7.38)
PH BLDCO: 7.41 (ref 7.15–7.38)
PLATELET # BLD AUTO: 225 K/UL (ref 150–450)
PMV BLD AUTO: 12.2 FL (ref 9.4–12.3)
PO2 BLDCO: 16 MMHG
PO2 BLDCO: 27 MMHG
RBC # BLD AUTO: 4.74 M/UL (ref 4.05–5.2)
SAO2 % BLD: 20.4 % (ref 95–98)
SAO2 % BLDV: 50.9 % (ref 65–88)
SERVICE CMNT-IMP: ABNORMAL
SERVICE CMNT-IMP: ABNORMAL
SPECIMEN EXP DATE BLD: NORMAL
SPECIMEN TYPE: ABNORMAL
SPECIMEN TYPE: ABNORMAL
WBC # BLD AUTO: 16.6 K/UL (ref 4.3–11.1)

## 2023-01-13 PROCEDURE — 3600000002 HC SURGERY LEVEL 2 BASE: Performed by: OBSTETRICS & GYNECOLOGY

## 2023-01-13 PROCEDURE — 6360000002 HC RX W HCPCS: Performed by: OBSTETRICS & GYNECOLOGY

## 2023-01-13 PROCEDURE — 6370000000 HC RX 637 (ALT 250 FOR IP): Performed by: ANESTHESIOLOGY

## 2023-01-13 PROCEDURE — 6370000000 HC RX 637 (ALT 250 FOR IP): Performed by: OBSTETRICS & GYNECOLOGY

## 2023-01-13 PROCEDURE — 36600 WITHDRAWAL OF ARTERIAL BLOOD: CPT

## 2023-01-13 PROCEDURE — 2580000003 HC RX 258: Performed by: OBSTETRICS & GYNECOLOGY

## 2023-01-13 PROCEDURE — 85027 COMPLETE CBC AUTOMATED: CPT

## 2023-01-13 PROCEDURE — 7100000001 HC PACU RECOVERY - ADDTL 15 MIN: Performed by: OBSTETRICS & GYNECOLOGY

## 2023-01-13 PROCEDURE — 7100000011 HC PHASE II RECOVERY - ADDTL 15 MIN

## 2023-01-13 PROCEDURE — 6360000002 HC RX W HCPCS: Performed by: NURSE ANESTHETIST, CERTIFIED REGISTERED

## 2023-01-13 PROCEDURE — 86901 BLOOD TYPING SEROLOGIC RH(D): CPT

## 2023-01-13 PROCEDURE — 59514 CESAREAN DELIVERY ONLY: CPT | Performed by: OBSTETRICS & GYNECOLOGY

## 2023-01-13 PROCEDURE — 2709999900 HC NON-CHARGEABLE SUPPLY: Performed by: OBSTETRICS & GYNECOLOGY

## 2023-01-13 PROCEDURE — 82803 BLOOD GASES ANY COMBINATION: CPT

## 2023-01-13 PROCEDURE — 7100000000 HC PACU RECOVERY - FIRST 15 MIN: Performed by: OBSTETRICS & GYNECOLOGY

## 2023-01-13 PROCEDURE — 2580000003 HC RX 258: Performed by: NURSE ANESTHETIST, CERTIFIED REGISTERED

## 2023-01-13 PROCEDURE — 7100000010 HC PHASE II RECOVERY - FIRST 15 MIN

## 2023-01-13 PROCEDURE — 2500000003 HC RX 250 WO HCPCS: Performed by: ANESTHESIOLOGY

## 2023-01-13 PROCEDURE — 3700000000 HC ANESTHESIA ATTENDED CARE: Performed by: OBSTETRICS & GYNECOLOGY

## 2023-01-13 PROCEDURE — 6360000002 HC RX W HCPCS: Performed by: ANESTHESIOLOGY

## 2023-01-13 PROCEDURE — 59025 FETAL NON-STRESS TEST: CPT

## 2023-01-13 PROCEDURE — 1100000000 HC RM PRIVATE

## 2023-01-13 PROCEDURE — 3600000012 HC SURGERY LEVEL 2 ADDTL 15MIN: Performed by: OBSTETRICS & GYNECOLOGY

## 2023-01-13 PROCEDURE — 3700000001 HC ADD 15 MINUTES (ANESTHESIA): Performed by: OBSTETRICS & GYNECOLOGY

## 2023-01-13 PROCEDURE — 2500000003 HC RX 250 WO HCPCS: Performed by: NURSE ANESTHETIST, CERTIFIED REGISTERED

## 2023-01-13 PROCEDURE — 3609079900 HC CESAREAN SECTION

## 2023-01-13 RX ORDER — HYDROMORPHONE HYDROCHLORIDE 1 MG/ML
0.5 INJECTION, SOLUTION INTRAMUSCULAR; INTRAVENOUS; SUBCUTANEOUS
Status: DISCONTINUED | OUTPATIENT
Start: 2023-01-13 | End: 2023-01-14

## 2023-01-13 RX ORDER — SODIUM CHLORIDE, SODIUM LACTATE, POTASSIUM CHLORIDE, CALCIUM CHLORIDE 600; 310; 30; 20 MG/100ML; MG/100ML; MG/100ML; MG/100ML
INJECTION, SOLUTION INTRAVENOUS CONTINUOUS
Status: DISCONTINUED | OUTPATIENT
Start: 2023-01-13 | End: 2023-01-14

## 2023-01-13 RX ORDER — SODIUM CHLORIDE 0.9 % (FLUSH) 0.9 %
10 SYRINGE (ML) INJECTION EVERY 12 HOURS SCHEDULED
Status: DISCONTINUED | OUTPATIENT
Start: 2023-01-13 | End: 2023-01-13

## 2023-01-13 RX ORDER — OXYCODONE HYDROCHLORIDE 5 MG/1
5 TABLET ORAL EVERY 4 HOURS PRN
Status: DISCONTINUED | OUTPATIENT
Start: 2023-01-13 | End: 2023-01-14

## 2023-01-13 RX ORDER — KETOROLAC TROMETHAMINE 30 MG/ML
30 INJECTION, SOLUTION INTRAMUSCULAR; INTRAVENOUS EVERY 6 HOURS PRN
Status: DISCONTINUED | OUTPATIENT
Start: 2023-01-13 | End: 2023-01-14

## 2023-01-13 RX ORDER — DOCUSATE SODIUM 100 MG/1
100 CAPSULE, LIQUID FILLED ORAL 2 TIMES DAILY
Status: DISCONTINUED | OUTPATIENT
Start: 2023-01-13 | End: 2023-01-15 | Stop reason: HOSPADM

## 2023-01-13 RX ORDER — FERROUS SULFATE 325(65) MG
325 TABLET ORAL 2 TIMES DAILY WITH MEALS
Status: DISCONTINUED | OUTPATIENT
Start: 2023-01-13 | End: 2023-01-15 | Stop reason: HOSPADM

## 2023-01-13 RX ORDER — LANOLIN
CREAM (ML) TOPICAL
Status: DISCONTINUED | OUTPATIENT
Start: 2023-01-13 | End: 2023-01-15 | Stop reason: HOSPADM

## 2023-01-13 RX ORDER — LIDOCAINE HYDROCHLORIDE 10 MG/ML
1 INJECTION, SOLUTION INFILTRATION; PERINEURAL
Status: DISCONTINUED | OUTPATIENT
Start: 2023-01-13 | End: 2023-01-13 | Stop reason: HOSPADM

## 2023-01-13 RX ORDER — NALBUPHINE HCL 10 MG/ML
5 AMPUL (ML) INJECTION EVERY 4 HOURS PRN
Status: DISCONTINUED | OUTPATIENT
Start: 2023-01-13 | End: 2023-01-14

## 2023-01-13 RX ORDER — SODIUM CHLORIDE 0.9 % (FLUSH) 0.9 %
5-40 SYRINGE (ML) INJECTION EVERY 12 HOURS SCHEDULED
Status: DISCONTINUED | OUTPATIENT
Start: 2023-01-13 | End: 2023-01-13 | Stop reason: HOSPADM

## 2023-01-13 RX ORDER — ONDANSETRON 2 MG/ML
4 INJECTION INTRAMUSCULAR; INTRAVENOUS EVERY 6 HOURS PRN
Status: DISCONTINUED | OUTPATIENT
Start: 2023-01-13 | End: 2023-01-14

## 2023-01-13 RX ORDER — KETOROLAC TROMETHAMINE 30 MG/ML
INJECTION, SOLUTION INTRAMUSCULAR; INTRAVENOUS PRN
Status: DISCONTINUED | OUTPATIENT
Start: 2023-01-13 | End: 2023-01-13 | Stop reason: SDUPTHER

## 2023-01-13 RX ORDER — SIMETHICONE 80 MG
80 TABLET,CHEWABLE ORAL EVERY 6 HOURS PRN
Status: DISCONTINUED | OUTPATIENT
Start: 2023-01-13 | End: 2023-01-15 | Stop reason: HOSPADM

## 2023-01-13 RX ORDER — SODIUM CHLORIDE, SODIUM LACTATE, POTASSIUM CHLORIDE, CALCIUM CHLORIDE 600; 310; 30; 20 MG/100ML; MG/100ML; MG/100ML; MG/100ML
INJECTION, SOLUTION INTRAVENOUS CONTINUOUS
Status: DISCONTINUED | OUTPATIENT
Start: 2023-01-13 | End: 2023-01-13

## 2023-01-13 RX ORDER — SODIUM CHLORIDE, SODIUM LACTATE, POTASSIUM CHLORIDE, AND CALCIUM CHLORIDE .6; .31; .03; .02 G/100ML; G/100ML; G/100ML; G/100ML
1000 INJECTION, SOLUTION INTRAVENOUS ONCE
Status: DISCONTINUED | OUTPATIENT
Start: 2023-01-13 | End: 2023-01-13

## 2023-01-13 RX ORDER — FENTANYL CITRATE 50 UG/ML
INJECTION, SOLUTION INTRAMUSCULAR; INTRAVENOUS
Status: COMPLETED | OUTPATIENT
Start: 2023-01-13 | End: 2023-01-13

## 2023-01-13 RX ORDER — METHYLERGONOVINE MALEATE 0.2 MG/ML
200 INJECTION INTRAVENOUS ONCE AS NEEDED
Status: DISCONTINUED | OUTPATIENT
Start: 2023-01-13 | End: 2023-01-15 | Stop reason: HOSPADM

## 2023-01-13 RX ORDER — SODIUM CHLORIDE 9 MG/ML
INJECTION, SOLUTION INTRAVENOUS PRN
Status: DISCONTINUED | OUTPATIENT
Start: 2023-01-13 | End: 2023-01-13

## 2023-01-13 RX ORDER — FAMOTIDINE 20 MG/1
20 TABLET, FILM COATED ORAL 2 TIMES DAILY
Status: DISCONTINUED | OUTPATIENT
Start: 2023-01-13 | End: 2023-01-15 | Stop reason: HOSPADM

## 2023-01-13 RX ORDER — SODIUM CHLORIDE 0.9 % (FLUSH) 0.9 %
5-40 SYRINGE (ML) INJECTION EVERY 12 HOURS SCHEDULED
Status: DISCONTINUED | OUTPATIENT
Start: 2023-01-13 | End: 2023-01-14

## 2023-01-13 RX ORDER — SODIUM CHLORIDE 9 MG/ML
INJECTION, SOLUTION INTRAVENOUS PRN
Status: DISCONTINUED | OUTPATIENT
Start: 2023-01-13 | End: 2023-01-14

## 2023-01-13 RX ORDER — ONDANSETRON 2 MG/ML
INJECTION INTRAMUSCULAR; INTRAVENOUS PRN
Status: DISCONTINUED | OUTPATIENT
Start: 2023-01-13 | End: 2023-01-13 | Stop reason: SDUPTHER

## 2023-01-13 RX ORDER — SODIUM CHLORIDE 0.9 % (FLUSH) 0.9 %
5-40 SYRINGE (ML) INJECTION PRN
Status: DISCONTINUED | OUTPATIENT
Start: 2023-01-13 | End: 2023-01-13 | Stop reason: HOSPADM

## 2023-01-13 RX ORDER — NALOXONE HYDROCHLORIDE 0.4 MG/ML
INJECTION, SOLUTION INTRAMUSCULAR; INTRAVENOUS; SUBCUTANEOUS PRN
Status: DISCONTINUED | OUTPATIENT
Start: 2023-01-13 | End: 2023-01-14

## 2023-01-13 RX ORDER — BUPIVACAINE HYDROCHLORIDE 7.5 MG/ML
INJECTION, SOLUTION INTRASPINAL
Status: COMPLETED | OUTPATIENT
Start: 2023-01-13 | End: 2023-01-13

## 2023-01-13 RX ORDER — SODIUM CHLORIDE 0.9 % (FLUSH) 0.9 %
5-40 SYRINGE (ML) INJECTION PRN
Status: DISCONTINUED | OUTPATIENT
Start: 2023-01-13 | End: 2023-01-14

## 2023-01-13 RX ORDER — SODIUM CHLORIDE, SODIUM LACTATE, POTASSIUM CHLORIDE, CALCIUM CHLORIDE 600; 310; 30; 20 MG/100ML; MG/100ML; MG/100ML; MG/100ML
INJECTION, SOLUTION INTRAVENOUS CONTINUOUS PRN
Status: DISCONTINUED | OUTPATIENT
Start: 2023-01-13 | End: 2023-01-13 | Stop reason: SDUPTHER

## 2023-01-13 RX ORDER — SODIUM CHLORIDE 0.9 % (FLUSH) 0.9 %
10 SYRINGE (ML) INJECTION PRN
Status: DISCONTINUED | OUTPATIENT
Start: 2023-01-13 | End: 2023-01-13

## 2023-01-13 RX ORDER — SODIUM CHLORIDE 9 MG/ML
INJECTION, SOLUTION INTRAVENOUS PRN
Status: DISCONTINUED | OUTPATIENT
Start: 2023-01-13 | End: 2023-01-13 | Stop reason: HOSPADM

## 2023-01-13 RX ORDER — FAMOTIDINE 20 MG/1
20 TABLET, FILM COATED ORAL ONCE
Status: COMPLETED | OUTPATIENT
Start: 2023-01-13 | End: 2023-01-13

## 2023-01-13 RX ORDER — TRISODIUM CITRATE DIHYDRATE AND CITRIC ACID MONOHYDRATE 500; 334 MG/5ML; MG/5ML
30 SOLUTION ORAL ONCE
Status: COMPLETED | OUTPATIENT
Start: 2023-01-13 | End: 2023-01-13

## 2023-01-13 RX ORDER — MORPHINE SULFATE 0.5 MG/ML
INJECTION, SOLUTION EPIDURAL; INTRATHECAL; INTRAVENOUS
Status: COMPLETED | OUTPATIENT
Start: 2023-01-13 | End: 2023-01-13

## 2023-01-13 RX ORDER — ACETAMINOPHEN 325 MG/1
650 TABLET ORAL EVERY 6 HOURS
Status: DISCONTINUED | OUTPATIENT
Start: 2023-01-13 | End: 2023-01-14

## 2023-01-13 RX ORDER — PRENATAL VIT/IRON FUM/FOLIC AC 27MG-0.8MG
1 TABLET ORAL DAILY
Status: DISCONTINUED | OUTPATIENT
Start: 2023-01-13 | End: 2023-01-15 | Stop reason: HOSPADM

## 2023-01-13 RX ADMIN — PHENYLEPHRINE HYDROCHLORIDE 100 MCG: 0.1 INJECTION, SOLUTION INTRAVENOUS at 07:56

## 2023-01-13 RX ADMIN — PHENYLEPHRINE HYDROCHLORIDE 100 MCG: 0.1 INJECTION, SOLUTION INTRAVENOUS at 07:40

## 2023-01-13 RX ADMIN — FAMOTIDINE 20 MG: 20 TABLET, FILM COATED ORAL at 07:18

## 2023-01-13 RX ADMIN — SODIUM CHLORIDE, SODIUM LACTATE, POTASSIUM CHLORIDE, AND CALCIUM CHLORIDE: 600; 310; 30; 20 INJECTION, SOLUTION INTRAVENOUS at 08:08

## 2023-01-13 RX ADMIN — SODIUM CHLORIDE, POTASSIUM CHLORIDE, SODIUM LACTATE AND CALCIUM CHLORIDE: 600; 310; 30; 20 INJECTION, SOLUTION INTRAVENOUS at 10:47

## 2023-01-13 RX ADMIN — DOCUSATE SODIUM 100 MG: 100 CAPSULE ORAL at 12:33

## 2023-01-13 RX ADMIN — FAMOTIDINE 20 MG: 20 TABLET, FILM COATED ORAL at 20:20

## 2023-01-13 RX ADMIN — FERROUS SULFATE TAB 325 MG (65 MG ELEMENTAL FE) 325 MG: 325 (65 FE) TAB at 20:20

## 2023-01-13 RX ADMIN — ONDANSETRON 4 MG: 2 INJECTION INTRAMUSCULAR; INTRAVENOUS at 08:04

## 2023-01-13 RX ADMIN — NALBUPHINE HYDROCHLORIDE 5 MG: 10 INJECTION, SOLUTION INTRAMUSCULAR; INTRAVENOUS; SUBCUTANEOUS at 16:08

## 2023-01-13 RX ADMIN — BUPIVACAINE HYDROCHLORIDE IN DEXTROSE 12 MG: 7.5 INJECTION, SOLUTION SUBARACHNOID at 07:32

## 2023-01-13 RX ADMIN — Medication 3000 MG: at 07:19

## 2023-01-13 RX ADMIN — SODIUM CHLORIDE, SODIUM LACTATE, POTASSIUM CHLORIDE, AND CALCIUM CHLORIDE: 600; 310; 30; 20 INJECTION, SOLUTION INTRAVENOUS at 07:26

## 2023-01-13 RX ADMIN — PHENYLEPHRINE HYDROCHLORIDE 100 MCG: 0.1 INJECTION, SOLUTION INTRAVENOUS at 07:51

## 2023-01-13 RX ADMIN — ACETAMINOPHEN 650 MG: 325 TABLET, FILM COATED ORAL at 20:19

## 2023-01-13 RX ADMIN — MORPHINE SULFATE 0.15 MG: 0.5 INJECTION, SOLUTION EPIDURAL; INTRATHECAL; INTRAVENOUS at 07:32

## 2023-01-13 RX ADMIN — KETOROLAC TROMETHAMINE 30 MG: 30 INJECTION, SOLUTION INTRAMUSCULAR; INTRAVENOUS at 22:02

## 2023-01-13 RX ADMIN — PHENYLEPHRINE HYDROCHLORIDE 100 MCG: 0.1 INJECTION, SOLUTION INTRAVENOUS at 08:14

## 2023-01-13 RX ADMIN — PHENYLEPHRINE HYDROCHLORIDE 150 MCG: 0.1 INJECTION, SOLUTION INTRAVENOUS at 06:39

## 2023-01-13 RX ADMIN — PHENYLEPHRINE HYDROCHLORIDE 100 MCG: 0.1 INJECTION, SOLUTION INTRAVENOUS at 08:20

## 2023-01-13 RX ADMIN — FENTANYL CITRATE 15 MCG: 50 INJECTION, SOLUTION INTRAMUSCULAR; INTRAVENOUS at 07:32

## 2023-01-13 RX ADMIN — DOCUSATE SODIUM 100 MG: 100 CAPSULE ORAL at 20:20

## 2023-01-13 RX ADMIN — Medication 500 ML/HR: at 08:02

## 2023-01-13 RX ADMIN — KETOROLAC TROMETHAMINE 30 MG: 30 INJECTION, SOLUTION INTRAMUSCULAR at 08:28

## 2023-01-13 RX ADMIN — KETOROLAC TROMETHAMINE 30 MG: 30 INJECTION, SOLUTION INTRAMUSCULAR; INTRAVENOUS at 16:08

## 2023-01-13 RX ADMIN — PHENYLEPHRINE HYDROCHLORIDE 150 MCG: 0.1 INJECTION, SOLUTION INTRAVENOUS at 07:48

## 2023-01-13 RX ADMIN — PHENYLEPHRINE HYDROCHLORIDE 100 MCG: 0.1 INJECTION, SOLUTION INTRAVENOUS at 07:43

## 2023-01-13 RX ADMIN — TRISODIUM CITRATE DIHYDRATE AND CITRIC ACID MONOHYDRATE 30 ML: 500; 334 SOLUTION ORAL at 07:19

## 2023-01-13 ASSESSMENT — PAIN SCALES - GENERAL
PAINLEVEL_OUTOF10: 3
PAINLEVEL_OUTOF10: 4
PAINLEVEL_OUTOF10: 1

## 2023-01-13 ASSESSMENT — PAIN DESCRIPTION - DESCRIPTORS
DESCRIPTORS: ACHING
DESCRIPTORS: SORE

## 2023-01-13 ASSESSMENT — PAIN DESCRIPTION - LOCATION
LOCATION: ABDOMEN
LOCATION: ABDOMEN;INCISION

## 2023-01-13 ASSESSMENT — PAIN DESCRIPTION - ORIENTATION: ORIENTATION: LOWER

## 2023-01-13 ASSESSMENT — PAIN - FUNCTIONAL ASSESSMENT: PAIN_FUNCTIONAL_ASSESSMENT: ACTIVITIES ARE NOT PREVENTED

## 2023-01-13 NOTE — PLAN OF CARE
Problem: Vaginal Birth or  Section  Goal: Fetal and maternal status remain reassuring during the birth process  Description:  Birth OB-Pregnancy care plan goal which identifies if the fetal and maternal status remain reassuring during the birth process  Outcome: Progressing     Problem: Infection - Adult  Goal: Absence of infection during hospitalization  Outcome: Progressing     Problem: Safety - Adult  Goal: Free from fall injury  Outcome: Progressing

## 2023-01-13 NOTE — ANESTHESIA PROCEDURE NOTES
Spinal Block    Patient location during procedure: OR  End time: 1/13/2023 7:38 AM  Reason for block: primary anesthetic  Staffing  Performed: anesthesiologist   Anesthesiologist: Billy Foster MD  Spinal Block  Patient position: sitting  Prep: ChloraPrep and site prepped and draped  Patient monitoring: cardiac monitor, continuous pulse ox, frequent blood pressure checks and oxygen  Approach: midline  Location: L3/L4  Provider prep: mask and sterile gloves  Local infiltration: lidocaine  Needle  Needle type:  Tomás   Needle gauge: 25 G  Assessment  Swirl obtained: Yes  CSF: clear  Attempts: 1  Hemodynamics: stable  Preanesthetic Checklist  Completed: patient identified, IV checked, site marked, risks and benefits discussed, surgical/procedural consents, equipment checked, pre-op evaluation, timeout performed, anesthesia consent given, oxygen available and monitors applied/VS acknowledged

## 2023-01-13 NOTE — H&P
Subjective:     Wilbert Landon, MRN: [id number], is a 20 y.o.  female presents with TIUP for repeat Csection, prior c section for twins. . unchanged course. See office notes on prenatal care. Patient Active Problem List    Diagnosis Date Noted    39 weeks gestation of pregnancy 2023    Vaginal discharge during pregnancy in third trimester 2023    Tachycardia 2022    Glucosuria 2022    Abdominal pain affecting pregnancy 2022    Depression affecting pregnancy in second trimester, antepartum 2022    History of twin pregnancy in prior pregnancy 2022    Abnormal Pap smear of cervix 2022    High-risk pregnancy in second trimester 2022    Obesity affecting pregnancy 2022    History of  2022     Past Medical History:   Diagnosis Date    Allergic rhinitis     Chlamydia infection 2018    Depression affecting pregnancy in second trimester, antepartum 2022      Past Surgical History:   Procedure Laterality Date     SECTION      TONSILLECTOMY      WISDOM TOOTH EXTRACTION        Medications Prior to Admission: aspirin-acetaminophen-caffeine (EXCEDRIN MIGRAINE) 250-250-65 MG per tablet, Take 1 tablet by mouth every 6 hours as needed for Headaches (Patient not taking: No sig reported)  sertraline (ZOLOFT) 50 MG tablet, Take 1/2 pill everyday for first 6 days then take 1 whole pill everyday thereafter. Cholecalciferol (VITAMIN D3) 50 MCG (2000 UT) CAPS, Take 2,000 Units by mouth in the morning.   Prenatal-FeFum-FA-DHA w/o A (PRENATAL + DHA PO), Take by mouth  No Known Allergies   Social History     Tobacco Use    Smoking status: Never    Smokeless tobacco: Never   Substance Use Topics    Alcohol use: No      Family History   Problem Relation Age of Onset    Prostate Cancer Maternal Grandfather     COPD Maternal Grandmother     Bleeding Prob Maternal Grandmother     No Known Problems Father     Other Mother         scoliosis Prenatal Labs: No components found for: OBEXTABORH, OBEXTABSCRN, OBEXTRUBELLA, OBEXTGRBS, OBEXTHBSAG, OBEXTHIV, OBEXTRPR, OBEXTGONORR, OBEXTCHLAM     Review of Systems  Constitutional: negative  Respiratory: negative  Cardiovascular: negative  Musculoskeletal:negative    Objective:     Patient Vitals for the past 8 hrs:   BP Temp Temp src Pulse Resp SpO2   01/13/23 0553 130/77 99 °F (37.2 °C) Oral (!) 116 18 97 %   01/13/23 0551 -- -- -- (!) 136 -- --   01/13/23 0550 -- 99 °F (37.2 °C) Oral -- -- --     No intake or output data in the 24 hours ending 01/13/23 0714  /77   Pulse (!) 116   Temp 99 °F (37.2 °C) (Oral)   Resp 18   LMP 04/15/2022   SpO2 97%   General appearance: alert, appears stated age, cooperative, and no distress  Head: Normocephalic, without obvious abnormality, atraumatic  Back: symmetric, no curvature. ROM normal. No CVA tenderness. Lungs: clear to auscultation bilaterally  Heart: regular rate and rhythm, S1, S2 normal, no murmur, click, rub or gallop  Abdomen:  gravid at term  Extremities: extremities normal, atraumatic, no cyanosis or edema  Pulses: 2+ and symmetric  Skin: Skin color, texture, turgor normal. No rashes or lesions      Assessment:         Repeat C section at term. Discussed risks of infection, DVT, damage to bowel/bladder/other internal organs, bleeding/transfusion, scar tissue/adhesions. All questions answered, will proceed.     TIUP , beta neg  Plan:     Repeat csection at term with spinal anesthesia

## 2023-01-13 NOTE — LACTATION NOTE

## 2023-01-13 NOTE — PROGRESS NOTES
SBAR OUT Report: Mother    Verbal report given to Kala Adame RN on this patient, who is now being transferred to [facility]  for routine progression of patient care. The patient is  wearing a green \"Anesthesia-Duramorph\" band. Report consisted of patient's Situation, Background, Assessment and Recommendations (SBAR).  ID bands were compared with the identification form, and verified with the patient and receiving nurse. Information from the Nurse Handoff Report and the Osbaldo Report was reviewed with the receiving nurse; opportunity for questions and clarification provided.

## 2023-01-13 NOTE — ANESTHESIA PRE PROCEDURE
Department of Anesthesiology  Preprocedure Note       Name:  Lisa Geronimo   Age:  20 y.o.  :  2000                                          MRN:  [id number]         Date:  2023      Surgeon: Ashleigh Tuttle):  Nate Ramirez MD    Procedure: Procedure(s):   SECTION    Medications prior to admission:   Prior to Admission medications    Medication Sig Start Date End Date Taking? Authorizing Provider   aspirin-acetaminophen-caffeine (EXCEDRIN MIGRAINE) 080-878-00 MG per tablet Take 1 tablet by mouth every 6 hours as needed for Headaches  Patient not taking: No sig reported    Historical Provider, MD   sertraline (ZOLOFT) 50 MG tablet Take 1/2 pill everyday for first 6 days then take 1 whole pill everyday thereafter. 22   Deny Horan MD   Cholecalciferol (VITAMIN D3) 50 MCG ( UT) CAPS Take 2,000 Units by mouth in the morning.     Historical Provider, MD   Prenatal-FeFum-FA-DHA w/o A (PRENATAL + DHA PO) Take by mouth    Historical Provider, MD       Current medications:    Current Facility-Administered Medications   Medication Dose Route Frequency Provider Last Rate Last Admin    lidocaine 1 % injection 1 mL  1 mL IntraDERmal Once PRN AVIVA Talavera MD        famotidine (PEPCID) tablet 20 mg  20 mg Oral Once AVIVA Talavera MD        citric acid-sodium citrate (BICITRA) solution 30 mL  30 mL Oral Once AVIVA Talavera MD        sodium chloride flush 0.9 % injection 5-40 mL  5-40 mL IntraVENous 2 times per day AVIVA Talavera MD        sodium chloride flush 0.9 % injection 5-40 mL  5-40 mL IntraVENous PRN AVIVA Talavera MD        0.9 % sodium chloride infusion   IntraVENous PRN AVIVA Talavera MD        lactated ringers infusion   IntraVENous Continuous Nate Ramirez MD        lactated ringers bolus  1,000 mL IntraVENous Once Nate Ramirez MD        sodium chloride flush 0.9 % injection 10 mL  10 mL IntraVENous 2 times per day Joselin Mccormick Martin Pemberton MD        sodium chloride flush 0.9 % injection 10 mL  10 mL IntraVENous PRN Kay Zhu MD        0.9 % sodium chloride infusion   IntraVENous PRN Kay Zhu MD        ceFAZolin (ANCEF) 3000 mg in sterile water 30 mL IV syringe  3,000 mg IntraVENous Once Kay Zhu MD           Allergies:  No Known Allergies    Problem List:    Patient Active Problem List   Diagnosis Code    High-risk pregnancy in second trimester O09.92    Obesity affecting pregnancy O99.210    History of twin pregnancy in prior pregnancy Z87.59    History of  Z98.891    Abnormal Pap smear of cervix R87.619    Depression affecting pregnancy in second trimester, antepartum O99.342, F32. A    Tachycardia R00.0    Glucosuria R81    Abdominal pain affecting pregnancy O26.899, R10.9    Vaginal discharge during pregnancy in third trimester O26.893, N89.8    39 weeks gestation of pregnancy Z3A.39       Past Medical History:        Diagnosis Date    Allergic rhinitis     Chlamydia infection 2018    Depression affecting pregnancy in second trimester, antepartum 2022       Past Surgical History:        Procedure Laterality Date     SECTION      TONSILLECTOMY      WISDOM TOOTH EXTRACTION         Social History:    Social History     Tobacco Use    Smoking status: Never    Smokeless tobacco: Never   Substance Use Topics    Alcohol use:  No                                Counseling given: Not Answered      Vital Signs (Current):   Vitals:    23 0550 23 0551 23 0553   BP:   130/77   Pulse:  (!) 136 (!) 116   Resp:   18   Temp: 99 °F (37.2 °C)  99 °F (37.2 °C)   TempSrc: Oral  Oral   SpO2:   97%                                              BP Readings from Last 3 Encounters:   23 130/77   23 (!) 131/90   23 121/74       NPO Status: Time of last liquid consumption:                         Time of last solid consumption:  Date of last liquid consumption: 01/12/23                        Date of last solid food consumption: 01/12/23    BMI:   Wt Readings from Last 3 Encounters:   01/06/23 282 lb 12.8 oz (128.3 kg)   12/30/22 283 lb 6.4 oz (128.5 kg)   12/20/22 285 lb 6.4 oz (129.5 kg)     There is no height or weight on file to calculate BMI.    CBC:   Lab Results   Component Value Date/Time    WBC 16.6 01/13/2023 05:28 AM    RBC 4.74 01/13/2023 05:28 AM    HGB 12.0 01/13/2023 05:28 AM    HCT 37.5 01/13/2023 05:28 AM    MCV 79.1 01/13/2023 05:28 AM    RDW 14.4 01/13/2023 05:28 AM     01/13/2023 05:28 AM       CMP: No results found for: NA, K, CL, CO2, BUN, CREATININE, GFRAA, AGRATIO, LABGLOM, GLUCOSE, GLU, PROT, CALCIUM, BILITOT, ALKPHOS, AST, ALT    POC Tests: No results for input(s): POCGLU, POCNA, POCK, POCCL, POCBUN, POCHEMO, POCHCT in the last 72 hours. Coags: No results found for: PROTIME, INR, APTT    HCG (If Applicable): No results found for: PREGTESTUR, PREGSERUM, HCG, HCGQUANT     ABGs: No results found for: PHART, PO2ART, EKE7GGK, UDI2XDU, BEART, Y2WRIBLH     Type & Screen (If Applicable):  No results found for: LABABO, LABRH    Drug/Infectious Status (If Applicable):  Lab Results   Component Value Date/Time    HEPCAB NONREACTIVE 06/20/2022 11:48 AM       COVID-19 Screening (If Applicable): No results found for: COVID19        Anesthesia Evaluation  Patient summary reviewed and Nursing notes reviewed no history of anesthetic complications:   Airway: Mallampati: II  TM distance: >3 FB   Neck ROM: full  Mouth opening: > = 3 FB   Dental: normal exam         Pulmonary:Negative Pulmonary ROS breath sounds clear to auscultation                             Cardiovascular:Negative CV ROS  Exercise tolerance: good (>4 METS),           Rhythm: regular  Rate: normal                    Neuro/Psych:   (+) headaches:, depression/anxiety             GI/Hepatic/Renal:            ROS comment: Obesity.    Endo/Other: Negative Endo/Other ROS                    Abdominal:             Vascular: negative vascular ROS. Other Findings:           Anesthesia Plan      spinal     ASA 2             Anesthetic plan and risks discussed with patient and spouse.               Post-op pain plan if not by surgeon: intrathecal narcotics            Roxana Arechiga MD   1/13/2023

## 2023-01-13 NOTE — PROCEDURES
Delivery Note    Patient Name: Beatrice Rogers  MRN: [id number]    Obstetrician:  Rubi Stroud MD    Assistant:  Dr Tramaine Smith    Pre-Delivery Diagnosis: Term pregnancy, Single fetus, and Uncomplicated pregnancy    Post-Delivery Diagnosis: Female    Intrapartum Event: None    Procedure: Repeat Low Transverse  section    SPINAL    Monitor:  Fetal Heart Tones - External and Uterine Contractions - External    Indications for instrumental delivery: elective    Estimated Blood Loss: 700    Presentation: Cephalic    Fetal Description: pedroza    Fetal Position: Right Occiput Posterior    Birth Weight: 110-2    Birth Length: 53    Apgar - One Minute: 8    Apgar - Five Minutes: 9    Umbilical Cord: 3 vessels present and Cord blood sent to lab for type, Rh, and Lia' test    Specimens: none           Complications:  none             Attending Attestation: I was present and scrubbed for the entire procedure.

## 2023-01-13 NOTE — LACTATION NOTE
This note was copied from a baby's chart. Lactation visit. LGA infant. Has latched and fed well x 2 so far, only a few hours old. Blood glucose stable. Due to feed now. Assisted with latching on both breasts. Football hold. Everted nipples. Baby eager and self attaches without much assistance needed. Baby on and off a bit at start but then latched well and stayed on consistently x 10 minutes on right breast. Burped well post feed and then switched to left breast, again latched well and feeding now on left side well. Colostrum leaked from left side while baby nursing on right side, easily hand expressed colostrum prior to latch also. Doing well. No need to supplement. Reviewed feeding expectations. Feed on demand, at least every 3 hours. Offer both sides at all feeds. Will watch output and weight loss with LGA status. Questions answered.

## 2023-01-13 NOTE — ANESTHESIA POSTPROCEDURE EVALUATION
Department of Anesthesiology  Postprocedure Note    Patient: Kareem Kay  MRN: [id number]  YOB: 2000  Date of evaluation: 2023      Procedure Summary     Date: 23 Room / Location: [facility] MAIN OR 02 / [facility] MAIN OR    Anesthesia Start: 727 Anesthesia Stop: 8036    Procedure:  SECTION (Abdomen) Diagnosis:       History of       (History of  [Z98.891])    Surgeons: Angelita Turner MD Responsible Provider: Rosalind Galeano MD    Anesthesia Type: spinal ASA Status: 2          Anesthesia Type: No value filed. Yas Phase I:      Yas Phase II:        Anesthesia Post Evaluation    Patient location during evaluation: bedside  Patient participation: complete - patient participated  Level of consciousness: awake and alert  Airway patency: patent  Nausea & Vomiting: no nausea and no vomiting  Complications: no  Cardiovascular status: hemodynamically stable  Respiratory status: acceptable  Hydration status: euvolemic  Comments: Blood pressure (!) 115/57, pulse 96, temperature 98.4 °F (36.9 °C), temperature source Oral, resp. rate 17, last menstrual period 04/15/2022, SpO2 97 %, unknown if currently breastfeeding.     Multimodal analgesia pain management approach

## 2023-01-14 ENCOUNTER — ANESTHESIA (OUTPATIENT)
Dept: MOTHER INFANT UNIT | Age: 23
End: 2023-01-14

## 2023-01-14 ENCOUNTER — ANESTHESIA EVENT (OUTPATIENT)
Dept: MOTHER INFANT UNIT | Age: 23
End: 2023-01-14

## 2023-01-14 LAB
ERYTHROCYTE [DISTWIDTH] IN BLOOD BY AUTOMATED COUNT: 14.6 % (ref 11.9–14.6)
HCT VFR BLD AUTO: 33.4 % (ref 35.8–46.3)
HGB BLD-MCNC: 10.4 G/DL (ref 11.7–15.4)
MCH RBC QN AUTO: 25 PG (ref 26.1–32.9)
MCHC RBC AUTO-ENTMCNC: 31.1 G/DL (ref 31.4–35)
MCV RBC AUTO: 80.3 FL (ref 82–102)
NRBC # BLD: 0 K/UL (ref 0–0.2)
PLATELET # BLD AUTO: 215 K/UL (ref 150–450)
PMV BLD AUTO: 11.9 FL (ref 9.4–12.3)
RBC # BLD AUTO: 4.16 M/UL (ref 4.05–5.2)
WBC # BLD AUTO: 12.1 K/UL (ref 4.3–11.1)

## 2023-01-14 PROCEDURE — 1100000000 HC RM PRIVATE

## 2023-01-14 PROCEDURE — 6360000002 HC RX W HCPCS: Performed by: ANESTHESIOLOGY

## 2023-01-14 PROCEDURE — 36415 COLL VENOUS BLD VENIPUNCTURE: CPT

## 2023-01-14 PROCEDURE — 6370000000 HC RX 637 (ALT 250 FOR IP): Performed by: ANESTHESIOLOGY

## 2023-01-14 PROCEDURE — 85027 COMPLETE CBC AUTOMATED: CPT

## 2023-01-14 PROCEDURE — 6370000000 HC RX 637 (ALT 250 FOR IP): Performed by: OBSTETRICS & GYNECOLOGY

## 2023-01-14 RX ORDER — IBUPROFEN 800 MG/1
800 TABLET ORAL EVERY 8 HOURS
Status: DISCONTINUED | OUTPATIENT
Start: 2023-01-14 | End: 2023-01-15 | Stop reason: HOSPADM

## 2023-01-14 RX ORDER — OXYCODONE HYDROCHLORIDE 5 MG/1
10 TABLET ORAL EVERY 4 HOURS PRN
Status: DISCONTINUED | OUTPATIENT
Start: 2023-01-14 | End: 2023-01-15 | Stop reason: HOSPADM

## 2023-01-14 RX ORDER — ONDANSETRON 8 MG/1
8 TABLET, ORALLY DISINTEGRATING ORAL EVERY 8 HOURS PRN
Status: DISCONTINUED | OUTPATIENT
Start: 2023-01-14 | End: 2023-01-15 | Stop reason: HOSPADM

## 2023-01-14 RX ORDER — MORPHINE SULFATE 4 MG/ML
4 INJECTION, SOLUTION INTRAMUSCULAR; INTRAVENOUS
Status: DISCONTINUED | OUTPATIENT
Start: 2023-01-14 | End: 2023-01-15 | Stop reason: HOSPADM

## 2023-01-14 RX ORDER — ACETAMINOPHEN 500 MG
1000 TABLET ORAL EVERY 8 HOURS PRN
Status: DISCONTINUED | OUTPATIENT
Start: 2023-01-14 | End: 2023-01-15 | Stop reason: HOSPADM

## 2023-01-14 RX ORDER — DIPHENHYDRAMINE HYDROCHLORIDE 50 MG/ML
25 INJECTION INTRAMUSCULAR; INTRAVENOUS EVERY 6 HOURS PRN
Status: DISCONTINUED | OUTPATIENT
Start: 2023-01-14 | End: 2023-01-15 | Stop reason: HOSPADM

## 2023-01-14 RX ORDER — OXYCODONE HYDROCHLORIDE 5 MG/1
5 TABLET ORAL EVERY 4 HOURS PRN
Status: DISCONTINUED | OUTPATIENT
Start: 2023-01-14 | End: 2023-01-15 | Stop reason: HOSPADM

## 2023-01-14 RX ADMIN — DOCUSATE SODIUM 100 MG: 100 CAPSULE ORAL at 23:00

## 2023-01-14 RX ADMIN — ACETAMINOPHEN 1000 MG: 500 TABLET, FILM COATED ORAL at 23:00

## 2023-01-14 RX ADMIN — DOCUSATE SODIUM 100 MG: 100 CAPSULE ORAL at 10:07

## 2023-01-14 RX ADMIN — SIMETHICONE 80 MG: 80 TABLET, CHEWABLE ORAL at 23:00

## 2023-01-14 RX ADMIN — SERTRALINE 50 MG: 50 TABLET, FILM COATED ORAL at 10:07

## 2023-01-14 RX ADMIN — IBUPROFEN 800 MG: 800 TABLET, FILM COATED ORAL at 18:17

## 2023-01-14 RX ADMIN — KETOROLAC TROMETHAMINE 30 MG: 30 INJECTION, SOLUTION INTRAMUSCULAR; INTRAVENOUS at 04:30

## 2023-01-14 RX ADMIN — IBUPROFEN 800 MG: 800 TABLET, FILM COATED ORAL at 10:07

## 2023-01-14 RX ADMIN — ACETAMINOPHEN 650 MG: 325 TABLET, FILM COATED ORAL at 02:00

## 2023-01-14 RX ADMIN — PRENATAL VIT W/ FE FUMARATE-FA TAB 27-0.8 MG 1 TABLET: 27-0.8 TAB at 10:07

## 2023-01-14 RX ADMIN — ACETAMINOPHEN 1000 MG: 500 TABLET, FILM COATED ORAL at 15:08

## 2023-01-14 ASSESSMENT — PAIN DESCRIPTION - LOCATION
LOCATION: ABDOMEN
LOCATION: ABDOMEN;INCISION
LOCATION: ABDOMEN;INCISION

## 2023-01-14 ASSESSMENT — PAIN DESCRIPTION - ORIENTATION
ORIENTATION: LOWER
ORIENTATION: LOWER

## 2023-01-14 ASSESSMENT — PAIN DESCRIPTION - DESCRIPTORS
DESCRIPTORS: SORE
DESCRIPTORS: ACHING
DESCRIPTORS: SORE

## 2023-01-14 ASSESSMENT — PAIN SCALES - GENERAL
PAINLEVEL_OUTOF10: 5
PAINLEVEL_OUTOF10: 3
PAINLEVEL_OUTOF10: 2
PAINLEVEL_OUTOF10: 4

## 2023-01-14 ASSESSMENT — PAIN - FUNCTIONAL ASSESSMENT
PAIN_FUNCTIONAL_ASSESSMENT: ACTIVITIES ARE NOT PREVENTED
PAIN_FUNCTIONAL_ASSESSMENT: ACTIVITIES ARE NOT PREVENTED

## 2023-01-14 NOTE — PROGRESS NOTES
Anesthesiology  Post-op Note    Post-op day 1 s/p  via spinal with neuraxial opioids for post-op pain management. /71   Pulse (!) 105   Temp 98.9 °F (37.2 °C) (Oral)   Resp 18   LMP 04/15/2022   SpO2 97%   Breastfeeding Unknown   Airway patent, patient appropriately hydrated and appears euvolemic. Patient is Alert and oriented. Pain is well controlled. Pruritus is well controlled. Nausea is absent. No complaints about back or site of injection. Motor and sensory function has returned to baseline in lower extremities. Patient is satisfied with anesthetic and reports no complications. Continue current orders, then initiate surgeon's orders for pain management 24 hours after . Follow up per surgeon.

## 2023-01-14 NOTE — PROGRESS NOTES
Michael d/c'd, sequential device discontinued. Ambulated to restroom for teaching of citlalli-care and pad change. Bed linen changed. Returned to bed safely. Tolerated without difficulty.

## 2023-01-14 NOTE — LACTATION NOTE
This note was copied from a baby's chart. In to see mom and infant for lactation follow up. Mom has not put baby to breast much today. She states once this am. She states baby was feeding as well at breast as yesterday. She has started formula feeding baby as well per choice, several times today. Reviewed different feeding plan options and mom still not sure overall what she would like to do long term for feeding. Discussed just breast feeding, all formula, breast and bottle feed, or pump and bottle. Mom did for now want to learn how to pump. Reviewed she could pump in place of feed 15 minutes q 3hrs to provide stimulation to get milk in, or offer breast first and then if getting lots of formula after as still hungry, could \"insurance pump 10 minutes\" after daytime feeds to help milk supply rise to extra level of milk baby getting used to, galileo to help support 10lb baby. Full instruction given on how to use hospital grade pump and pump parts, collect milk and clean pump parts after. She pumped 0.5 mls and lactation gave to baby in syringe. Dad about to formula feed baby after as baby has not fed in several hours. Encouraged them to increase feeding time to q 2-3 hrs, 8-12 times per day, whatever method they chose. They verbalized understanding. Reviewed breast milk storage rules. No further needs at this time.  Lactation to follow up in am.

## 2023-01-14 NOTE — PROGRESS NOTES
Post-Operative Day Number 1 Progress Note    Patient doing well post-op day 1 from  delivery without significant complaints. Pain controlled on current medication. Voiding without difficulty, normal lochia. Vitals:  Blood pressure (!) 103/59, pulse 91, temperature 98.2 °F (36.8 °C), resp. rate 20, last menstrual period 04/15/2022, SpO2 97 %, unknown if currently breastfeeding. Vital signs stable, afebrile. Exam:  Patient without distress. Abdomen soft, fundus firm at level of umbilicus, nontender. Incision dry and  lean without erythema. Lower extremities are negative for swelling, cords or tenderness. Labs: Hgb 10.4    Assessment and Plan:  Patient appears to be having uncomplicated post- course. Continue routine post-op care and maternal education.

## 2023-01-14 NOTE — CARE COORDINATION
20 yr-old MF with infant girl. Hx depression. Currently on Zoloft; she reports it works for her. Spouse present and supportive. Discussed and provided patient with  informational packet on  mood and anxiety disorders (resources/education). 23 1100   Service Assessment   Patient Orientation Alert and Oriented   Cognition Alert   History Provided By Patient   Primary Caregiver Self   Accompanied By/Relationship spouse   Support Systems Spouse/Significant Other   Patient's Healthcare Decision Maker is: Named in [address] [place] [address]   PCP Verified by CM Yes   Last Visit to PCP Within last 3 months   Prior Functional Level Independent in ADLs/IADLs   Current Functional Level Independent in ADLs/IADLs   Can patient return to prior living arrangement Yes   Ability to make needs known: Good   Family able to assist with home care needs: Yes   Would you like for me to discuss the discharge plan with any other family members/significant others, and if so, who?  No   Financial Resources Bert Marcus Other (Comment)

## 2023-01-15 VITALS
SYSTOLIC BLOOD PRESSURE: 105 MMHG | RESPIRATION RATE: 16 BRPM | DIASTOLIC BLOOD PRESSURE: 62 MMHG | HEART RATE: 88 BPM | OXYGEN SATURATION: 98 % | TEMPERATURE: 97.9 F

## 2023-01-15 PROBLEM — Z3A.39 39 WEEKS GESTATION OF PREGNANCY: Status: RESOLVED | Noted: 2023-01-13 | Resolved: 2023-01-15

## 2023-01-15 PROCEDURE — 6370000000 HC RX 637 (ALT 250 FOR IP): Performed by: OBSTETRICS & GYNECOLOGY

## 2023-01-15 RX ORDER — IBUPROFEN 800 MG/1
800 TABLET ORAL EVERY 8 HOURS PRN
Qty: 40 TABLET | Refills: 3 | Status: SHIPPED | OUTPATIENT
Start: 2023-01-15

## 2023-01-15 RX ADMIN — SERTRALINE 50 MG: 50 TABLET, FILM COATED ORAL at 10:27

## 2023-01-15 RX ADMIN — PRENATAL VIT W/ FE FUMARATE-FA TAB 27-0.8 MG 1 TABLET: 27-0.8 TAB at 10:27

## 2023-01-15 RX ADMIN — IBUPROFEN 800 MG: 800 TABLET, FILM COATED ORAL at 10:27

## 2023-01-15 RX ADMIN — DOCUSATE SODIUM 100 MG: 100 CAPSULE ORAL at 10:27

## 2023-01-15 RX ADMIN — ACETAMINOPHEN 1000 MG: 500 TABLET, FILM COATED ORAL at 06:15

## 2023-01-15 RX ADMIN — IBUPROFEN 800 MG: 800 TABLET, FILM COATED ORAL at 02:27

## 2023-01-15 ASSESSMENT — PAIN DESCRIPTION - LOCATION: LOCATION: ABDOMEN;INCISION

## 2023-01-15 ASSESSMENT — PAIN DESCRIPTION - ORIENTATION: ORIENTATION: LOWER

## 2023-01-15 ASSESSMENT — PAIN DESCRIPTION - DESCRIPTORS: DESCRIPTORS: SORE;CRAMPING

## 2023-01-15 ASSESSMENT — PAIN SCALES - GENERAL: PAINLEVEL_OUTOF10: 3

## 2023-01-15 NOTE — DISCHARGE SUMMARY
[facility] [facility] [address] [place] [address] Discharge Summary     Patient ID:  Davy Read  [id number]  58 y.o.  2000    Admit date: 2023    Discharge date and time: No discharge date for patient encounter. Admitting Physician: Jammie Fields MD     Discharge Physician: Margi Voss M.D. Admission Diagnoses: 39 weeks gestation of pregnancy [Z3A.39]    Problem List: Hospital - Principal Problem (Resolved):    39 weeks gestation of pregnancy  Active Problems:    * No active hospital problems. *   ; Other -   Patient Active Problem List   Diagnosis    High-risk pregnancy in second trimester    Obesity affecting pregnancy    History of twin pregnancy in prior pregnancy    History of     Abnormal Pap smear of cervix    Depression affecting pregnancy in second trimester, antepartum    Tachycardia    Glucosuria    Abdominal pain affecting pregnancy    Vaginal discharge during pregnancy in third trimester        Discharge Diagnoses: 39 weeks gestation of pregnancy [Z3A.39]    Hospital Course: Davy Read had unremarkeable progressive recovery. and Eating, ambulating, and voiding in a routine manner. Significant Diagnostic Studies: No results found for this or any previous visit (from the past 24 hour(s)). Procedures: repeat  section, low transverse incision    Discharge Exam:  /62   Pulse 88   Temp 97.9 °F (36.6 °C) (Oral)   Resp 16   LMP 04/15/2022   SpO2 98%   Breastfeeding Unknown      Heart: regular rate and rhythm, S1, S2 normal, no murmur, click, rub or gallop  Lungs:clear to auscultation bilaterally  Extremities: normal, atraumatic, no cyanosis or edema.  No DVT  Incision/episiotomy: no significant drainage, no dehiscence, no significant erythema    Patient Instructions:   Current Discharge Medication List        START taking these medications    Details   ibuprofen (ADVIL;MOTRIN) 800 MG tablet Take 1 tablet by mouth every 8 hours as needed for Pain  Qty: 40 tablet, Refills: 3           CONTINUE these medications which have NOT CHANGED    Details   sertraline (ZOLOFT) 50 MG tablet Take 1/2 pill everyday for first 6 days then take 1 whole pill everyday thereafter. Qty: 30 tablet, Refills: 5      Cholecalciferol (VITAMIN D3) 50 MCG (2000 UT) CAPS Take 2,000 Units by mouth in the morning. Prenatal-FeFum-FA-DHA w/o A (PRENATAL + DHA PO) Take by mouth           STOP taking these medications       aspirin-acetaminophen-caffeine (EXCEDRIN MIGRAINE) 250-250-65 MG per tablet Comments:   Reason for Stopping:              Activity: physical activity is restricted per discharge instructions  Diet: resume normal diet  Wound Care: Keep wound clean and dry, as directed    Follow-up with Adrian Rodriguez MD in 2 weeks.     Signed:  Adrian Rodriguez MD  1/15/2023  10:56 AM

## 2023-01-15 NOTE — LACTATION NOTE
This note was copied from a baby's chart.  Lactation visit. Mom has been breastfeeding and pumping, some formula also in past 24 hours. Baby latched to left breast now. Doing well. Mom states she plans to do more breastfeeding or pumping over formula. Provided feeding plan, reviewed feeding needs and intake needs based on LGA status. Feed or pump every 3 hours. Supplement ad jeanne. Questions answered. Mom rented pump for home use.

## 2023-01-15 NOTE — LACTATION NOTE
This note was copied from a baby's chart. Individualized Feeding Plan for Breastfeeding   Lactation Services [phone]    As much as possible, hold your baby on your chest so babys bare skin is against your bare skin with a blanket covering babys back, especially 30 minutes before it is time for baby to eat. Watch for early feeding cues such as, licking lips, sucking motions, rooting, hands to mouth. Crying is a late feeding cue. Feed your baby at least 8 times in 24 hours, or more if your baby is showing feeding cues. If baby is sleepy put baby skin to skin and watch for hunger cues. To rouse baby: unwrap, undress, massage hands, feet, & back, change diaper, gently change babys position from lying to sitting. 15-20 minutes on the first breast of active breastfeeding is considered a good feeding. Good, active breastfeeding is when baby is alert, tugging the nipple, their ear may move, and you can hear swallows. Allow baby to finish the first side before changing sides. Sleeping at the breast or only brief, light sucks should not be considered a good, full breastfeed. At each feeding:  __x__1. Do Suck Practice on finger before each feeding until sucking pattern is smooth. Try using index finger. Nail down towards tongue. __x__2. Hand Express for a few minutes prior to latching to help start milk flow. __x__3. Baby needs to NURSE WELL x 15-20 minutes on at least first breast, burp and offer 2nd breast at every feeding. If no sustained latch only attempt at breast for 10 minutes. If breastfeeding and then giving formula post feeding, ideally should also pump x 15 minutes to protect milk supply. If baby does not latch on and feed well on at least one side, or pump and bottle feeding   you should:   __x__4. Double pump for 15 minutes with breast massage and compression. Hand express for an additional 2-3 minutes per side.  Pump after each feeding attempt or poor feeding, up to 8 times per day. If you are not putting baby to the breast you need to pump 8 times a day. Pump every 3 hours. __x__5. Give baby all of the breast milk you obtain from pumping and then supplement formula as needed or desired each feeding     AVERAGE INTAKES OF COLOSTRUM BY HEALTHY  INFANTS:  Time  Day Intake (ml per feeding)  Based on 8 feedings per day. 1st 24 hrs  1 2-10 ml  24-48 hrs  2         15 ml  48-72 hrs  3 30-45 ml (1-1.5 oz)  amount needed per feeding  72-96 hrs  4 45-60ml (1.5oz)                          5-6      60-90ml ml (2-3oz)                           7         90ml+ (3oz+)      By day 7, baby will need 90ml + ml or 3oz+ oz at each feeding based on 8 feedings per day & babys weight. (1oz = 30ml). Total milk volume needed in 24 hours by Day 7 is 25-27 oz per day based on baby's birthweight of 10-2. The more often baby eats, the less volume they need per feeding. If baby is eating more often than the minimum of 8 times per day, they may take less per feeding. Comments: If pumping, suggest using olive oil or coconut oil on your nipples before pumping to help reduce the friction. Use feeding plan until follow up with pediatrician. OUTPATIENT APPOINTMENT Suggested. Outpatient services are located on the 4th floor at [facility]. Check in at the 4th floor registration desk (the same one you used when you came to have your baby).   Call for questions [phone]

## 2023-01-15 NOTE — LACTATION NOTE
This note was copied from a baby's chart. Early discharge. Mom and baby are going home today. Continue to offer the breast without restriction. Mom's milk should be fully in over the next few days. Reviewed engorgement precautions. Hand Expression has been demoed and written hand-out reviewed. As milk comes in baby will be more alert at the breast and swallows will be more obvious. Breasts may feel softer once baby has finished nursing. Baby should be back to birth weight by 3weeks of age. And then gain on average 1 oz per day for the next 2-3 months. Reviewed babies should be exclusively breastfeeding for the first 6 months and that breastfeeding should continue after introduction of appropriate complimentary foods after 6 months. Initial output should be at least 1 wet and 1 bowel movement for each day old baby is. By day 5-7 once milk is fully in baby will consistently have 6 or more soaking wet diapers and about 4 bowel movement. Some babies have a bowel movement with every feeding and some have 1-3 large bowel movements each day. Inadequate output may indicate inadequate feedings and should be reported to your Pediatrician. Bowel habits may change as baby gets older. Encouraged follow-up at Pediatrician in 1-2 days, no later than 1 week of age. Call [facility] for any questions as needed or to set up an OP visit. OP phone calls are returned within 24 hours. Community Breastfeeding Resource List given.

## 2023-01-15 NOTE — OP NOTE
[place]  OPERATIVE REPORT    Name:  Consuela Sever  MR#:  [id number]  :  2000  ACCOUNT #:  [de-identified]  DATE OF SERVICE:  2023    PREOPERATIVE DIAGNOSIS:  The patient at 44 weeks' gestation with prior  delivery, requesting repeat  delivery. POSTOPERATIVE DIAGNOSIS:  The patient at 44 weeks' gestation with prior  delivery, requesting repeat  delivery with operative delivery of a 10-pound 2-ounce viable female with Apgars of 8 and 9. PROCEDURE PERFORMED:  Repeat low transverse  section. SURGEON:  Ysabel Wiley MD    ASSISTANT:  Celeste Chamorro DO    ANESTHESIA:  Spinal.    COMPLICATIONS:  None. SPECIMENS REMOVED:  None. IMPLANTS:  None. ESTIMATED BLOOD LOSS:  700 mL. DRAINS:  Michael. PROCEDURE:  After informed consent, the patient was taken to the operating room and given spinal anesthesia. She was prepped and draped in the usual sterile fashion in the dorsal supine position. Time-out was accomplished. The previous Pfannenstiel scar was used as a guide, knife was used to cut through the skin and through the subcutaneous tissue to the fascia. This was extended bilaterally with Staples scissors. The rectus muscles were now divided inferiorly and superiorly and peritoneum was entered. This was divided superiorly and inferiorly. The DeLee bladder blade was placed in the lower abdominal cavity. The peritoneum was deflected downward on the lower uterine segment and hysterotomy was now scored in a low transverse fashion. The fluid was clear. Incision was now widened and the fetal vertex was delivered with fundal pressure. Mouth and nares were bulb suctioned. The remainder of the infant was delivered atraumatically, the cord was doubly clamped and cut. Apgars were awarded at 6 and 9. The placenta was now manually extracted with trailing membranes.   The uterus was exteriorized and curetted and now the hysterotomy was closed with a full-thickness running locking suture of #1 chromic. Tubes and ovaries were found to be normal, cul-de-sac was irrigated, and uterus was returned into the abdomen. The paracolic gutters were irrigated. Final inspection revealed good hemostasis, so the peritoneum was closed with 2-0 chromic in a running stitch. Rectus muscles were brought together in the midline with 2-0 chromic interrupted sutures. The fascia was reapproximated with the Stratafix 0 running suture. The subcutaneous tissue was irrigated and made hemostatic and then the subcutaneous tissue was closed with interrupted sutures of 2-0 plain. Skin was closed with 4-0 Vicryl in a subcuticular stitch and reinforced with Dermabond. Count was correct x2. The patient went to recovery in stable condition. Dr Lissa Rivera assistance was required for the c section due to the pre existing condition of prior C section with adhesions. The assistant extended fascia incision, aided in delivery of infant and in the repair of hysterotomy by suturing angle and doing half of the incision.        Refugio Barker MD      [id number]  D:  01/14/2023 23:57  T:  01/15/2023 9:37  JOB #:  [id number]

## 2023-01-15 NOTE — DISCHARGE INSTRUCTIONS
After Your Delivery (the Postpartum Period): Care Instructions  Overview     Congratulations on the birth of your baby. Like pregnancy, the  period can be a time of excitement, alba, and exhaustion. You may look at your wondrous little baby and feel happy. You may also be overwhelmed by your new sleep hours and new responsibilities. At first, babies often sleep during the days and are awake at night. They do not have a pattern or routine. They may make sudden gasps, jerk themselves awake, or look like they have crossed eyes. These are all normal, and they may even make you smile. In these first weeks after delivery, try to take good care of yourself. It may take 4 to 6 weeks to feel like yourself again, and possibly longer if you had a  birth. You will likely feel very tired for several weeks. Your days will be full of ups and downs, but lots of alba as well. Follow-up care is a key part of your treatment and safety. Be sure to make and go to all appointments, and call your doctor if you are having problems. It's also a good idea to know your test results and keep a list of the medicines you take. How can you care for yourself at home? Take care of your body after delivery  Use pads instead of tampons for the bloody flow that may last as long as 2 weeks. Ease cramps with ibuprofen (Advil, Motrin). Ease soreness of hemorrhoids and the area between your vagina and rectum with ice compresses or witch hazel pads. Ease constipation by drinking lots of fluid and eating high-fiber foods. Ask your doctor about over-the-counter stool softeners. Cleanse yourself with a gentle squeeze of warm water from a bottle instead of wiping with toilet paper. Take a sitz bath in warm water several times a day. Wear a good nursing bra. Ease sore and swollen breasts with warm, wet washcloths. If you aren't breastfeeding, use ice rather than heat for breast soreness.   Your period may not start for several months if you are breastfeeding. You may bleed more, and longer at first, than you did before you got pregnant. Wait until you are healed (about 4 to 6 weeks) before you have sex. Ask your doctor when it is okay for you to have sex. Try not to travel with your baby for 5 or 6 weeks. If you take a long car trip, make frequent stops to walk around and stretch. Avoid exhaustion  Rest every day. Try to nap when your baby naps. Ask another adult to be with you for a few days after delivery. Plan for  if you have other children. Stay flexible so you can eat at odd hours and sleep when you need to. Both you and your baby are making new schedules. Plan small trips to get out of the house. Change can make you feel less tired. Ask for help with housework, cooking, and shopping. Remind yourself that your job is to care for your baby. Know about help for postpartum depression  \"Baby blues\" are common for the first 1 to 2 weeks after birth. You may cry or feel sad or irritable for no reason. Rest whenever you can. Being tired makes it harder to handle your emotions. Go for walks with your baby. Talk to your partner, friends, and family about your feelings. If your symptoms last for more than a few weeks, or if you feel very depressed, ask your doctor for help. Postpartum depression can be treated. Support groups and counseling can help. Sometimes medicine can also help. Stay healthy  Eat healthy foods so you have more energy. If you breastfeed, avoid drugs. If you quit smoking during pregnancy, try to stay smoke-free. If you choose to have a drink now and then, have only one drink, and limit the number of occasions that you have a drink. Wait to breastfeed at least 2 hours after you have a drink to reduce the amount of alcohol the baby may get in the milk. Start daily exercise after 4 to 6 weeks, but rest when you feel tired. Learn exercises to tone your belly.  Try Kegel exercises to regain strength in your pelvic muscles. You can do these exercises while you stand or sit. (If doing these exercises causes pain, stop doing them and talk with your doctor.)  Squeeze your muscles as if you were trying not to pass gas. Or squeeze your muscles as if you were stopping the flow of urine. Your belly, legs, and buttocks shouldn't move. Hold the squeeze for 3 seconds, then relax for 5 to 10 seconds. Start with 3 seconds, then add 1 second each week until you are able to squeeze for 10 seconds. Repeat the exercise 10 times a session. Do 3 to 8 sessions a day. Find a class for you and your baby that has an exercise time. If you had a  birth, give yourself a bit more time before you exercise, and be careful. When should you call for help? Share this information with your partner, family, or a friend. They can help you watch for warning signs. Call 911  anytime you think you may need emergency care. For example, call if:    You have thoughts of harming yourself, your baby, or another person. You passed out (lost consciousness). You have chest pain, are short of breath, or cough up blood. You have a seizure. Call your doctor now or seek immediate medical care if:    You have signs of hemorrhage (too much bleeding), such as:  Heavy vaginal bleeding. This means that you are soaking through one or more pads in an hour. Or you pass blood clots bigger than an egg. Feeling dizzy or lightheaded, or you feel like you may faint. Feeling so tired or weak that you cannot do your usual activities. A fast or irregular heartbeat. New or worse belly pain. You have signs of infection, such as:  A fever. Vaginal discharge that smells bad. New or worse belly pain. You have symptoms of a blood clot in your leg (called a deep vein thrombosis), such as:  Pain in the calf, back of the knee, thigh, or groin. Redness and swelling in your leg or groin.      You have signs of preeclampsia, such as:  Sudden swelling of your face, hands, or feet. New vision problems (such as dimness, blurring, or seeing spots). A severe headache. Watch closely for changes in your health, and be sure to contact your doctor if:    Your vaginal bleeding isn't decreasing. You feel sad, anxious, or hopeless for more than a few days. You are having problems with your breasts or breastfeeding. Where can you learn more? Go to http://www.woods.com/ and enter A461 to learn more about \"After Your Delivery (the Postpartum Period): Care Instructions. \"  Current as of: February 23, 2022               Content Version: 13.5  © 2227-2012 [employer], [employer]. Care instructions adapted under license by [facility] [place] ([facility]). If you have questions about a medical condition or this instruction, always ask your healthcare professional. [address] any warranty or liability for your use of this information. DISCHARGE SUMMARY from Nurse            What to do at Home:    Pelvic rest for 6 weeks, Nothing in vagina for 6 weeks  No heavy lifting greater than 15 pounds  No push/pull motion such as sweeping/moping  No driving for 1-2 weeks, No driving if taking narcotics  No tub baths or swimming, showers only    Continue with citlalli bottle, continue to clean incision with unscented soap and water, keep clean and dry. Call Md with heavy bleeding, pain not relieved by prescribed medication, fever greater than 101, incision with signs of infection, foul smelling vaginal discharge, thoughts of harm to self or others           *  Please give a list of your current medications to your Primary Care Provider. *  Please update this list whenever your medications are discontinued, doses are      changed, or new medications (including over-the-counter products) are added. *  Please carry medication information at all times in case of emergency situations.     These are general instructions for a healthy lifestyle:    No smoking/ No tobacco products/ Avoid exposure to second hand smoke  Surgeon General's Warning:  Quitting smoking now greatly reduces serious risk to your health. Obesity, smoking, and sedentary lifestyle greatly increases your risk for illness    A healthy diet, regular physical exercise & weight monitoring are important for maintaining a healthy lifestyle    You may be retaining fluid if you have a history of heart failure or if you experience any of the following symptoms:  Weight gain of 3 pounds or more overnight or 5 pounds in a week, increased swelling in our hands or feet or shortness of breath while lying flat in bed. Please call your doctor as soon as you notice any of these symptoms; do not wait until your next office visit. The discharge information has been reviewed with the patient. The patient verbalized understanding. Discharge medications reviewed with the patient and appropriate educational materials and side effects teaching were provided.   ___________________________________________________________________________________________________________________________________

## 2023-01-23 ENCOUNTER — POSTPARTUM VISIT (OUTPATIENT)
Dept: OBGYN CLINIC | Age: 23
End: 2023-01-23
Payer: MEDICAID

## 2023-01-23 VITALS
WEIGHT: 255.4 LBS | SYSTOLIC BLOOD PRESSURE: 128 MMHG | HEIGHT: 67 IN | DIASTOLIC BLOOD PRESSURE: 72 MMHG | BODY MASS INDEX: 40.09 KG/M2

## 2023-01-23 RX ORDER — ACETAMINOPHEN AND CODEINE PHOSPHATE 120; 12 MG/5ML; MG/5ML
1 SOLUTION ORAL DAILY
Qty: 28 TABLET | Refills: 12 | Status: SHIPPED | OUTPATIENT
Start: 2023-01-23

## 2023-01-23 NOTE — PROGRESS NOTES
2 Week Postpartum Visit    Name: Tony Jenkins    Date: 2023    Age: 22 y.o.    OB History          3    Para   2    Term   2       0    AB   1    Living   3         SAB   1    IAB        Ectopic        Molar        Multiple   1    Live Births   3              LMP 04/15/2022        Delivered by Dr. Wray on 2023 by C section.    Infant's Gender: female  Birth Weight: 10lb 2.3 oz Feeding: breast milk feed exclusively   Desired Contraception: none - wants pill  Last Pap smear date: 2022  Last Pap smear result: ASCUS cannot exclude high grade squamous    Current Outpatient Medications   Medication Sig Dispense Refill    ibuprofen (ADVIL;MOTRIN) 800 MG tablet Take 1 tablet by mouth every 8 hours as needed for Pain 40 tablet 3    sertraline (ZOLOFT) 50 MG tablet Take 1/2 pill everyday for first 6 days then take 1 whole pill everyday thereafter. 30 tablet 5    Cholecalciferol (VITAMIN D3) 50 MCG (2000 UT) CAPS Take 2,000 Units by mouth in the morning.      Prenatal-FeFum-FA-DHA w/o A (PRENATAL + DHA PO) Take by mouth       No current facility-administered medications for this visit.         Physical Exam- incision clean, dry intact; there is a small amount of eversion of skin in the middle 2 inches. Will use topical neosporin.    Precautions, will follow up 1 month. Will need PAP  Start Micronor 1 week, cont PNVs and zoloft- no PP depression sxs

## 2023-02-20 ENCOUNTER — POSTPARTUM VISIT (OUTPATIENT)
Dept: OBGYN CLINIC | Age: 23
End: 2023-02-20

## 2023-02-20 VITALS
SYSTOLIC BLOOD PRESSURE: 126 MMHG | WEIGHT: 251.2 LBS | HEIGHT: 67 IN | DIASTOLIC BLOOD PRESSURE: 68 MMHG | BODY MASS INDEX: 39.43 KG/M2

## 2023-02-20 DIAGNOSIS — R87.611 ATYPICAL SQUAMOUS CELLS CANNOT EXCLUDE HIGH GRADE SQUAMOUS INTRAEPITHELIAL LESION ON CYTOLOGIC SMEAR OF CERVIX (ASC-H): ICD-10-CM

## 2023-03-02 ENCOUNTER — PATIENT MESSAGE (OUTPATIENT)
Dept: OBGYN CLINIC | Age: 23
End: 2023-03-02

## 2023-03-02 NOTE — TELEPHONE ENCOUNTER
Prescription sent to pharmacy per verbal orders by Dr. Martin Pemberton.       Orders Placed This Encounter    dicloxacillin (DYNAPEN) 500 MG capsule     Sig: Take 1 capsule by mouth 4 times daily for 10 days     Dispense:  40 capsule     Refill:  0

## 2023-03-30 ENCOUNTER — OFFICE VISIT (OUTPATIENT)
Dept: OBGYN CLINIC | Age: 23
End: 2023-03-30
Payer: MEDICAID

## 2023-03-30 VITALS — SYSTOLIC BLOOD PRESSURE: 122 MMHG | BODY MASS INDEX: 41.07 KG/M2 | WEIGHT: 262.2 LBS | DIASTOLIC BLOOD PRESSURE: 74 MMHG

## 2023-03-30 DIAGNOSIS — N61.0 MASTITIS: Primary | ICD-10-CM

## 2023-03-30 PROCEDURE — 99214 OFFICE O/P EST MOD 30 MIN: CPT | Performed by: NURSE PRACTITIONER

## 2023-03-30 RX ORDER — CEPHALEXIN 500 MG/1
500 CAPSULE ORAL 4 TIMES DAILY
Qty: 40 CAPSULE | Refills: 0 | Status: SHIPPED | OUTPATIENT
Start: 2023-03-30 | End: 2023-04-09

## 2023-03-30 NOTE — PROGRESS NOTES
Formerly [facility] SALENA  is a 20 y.o. [id number]  who presents today for possible mastitis sx in the both breast, but sx are worse in R breast. Was only on the R breast when she originally had abx 1mo ago. C/o Soreness, feels hot and tenderness. Pt states it has really been affecting her milk flow with decreased output. Pt was given antibiotics for this about 4 weeks ago. Pt was prescribed Dicloaxacillin by Dr. Louise Zamorano on 3/2/23. Pt finished the whole antibiotic and sx resolved completely. Sx returned 5d ago. She notes she does not feel well, but no fever/chills at this point. Temp 97.9    No LMP recorded. .   Last MGM Never      Social History     Tobacco Use    Smoking status: Never    Smokeless tobacco: Never   Substance Use Topics    Alcohol use: No           HISTORY:    Current Outpatient Medications   Medication Sig Dispense Refill    cephALEXin (KEFLEX) 500 MG capsule Take 1 capsule by mouth 4 times daily for 10 days 40 capsule 0    sertraline (ZOLOFT) 50 MG tablet Take 1/2 pill everyday for first 6 days then take 1 whole pill everyday thereafter. 30 tablet 5     No current facility-administered medications for this visit. ROS:  Gyn ROS: she complains of mastitis sx R>L    PHYSICAL EXAM:  Blood pressure 122/74, weight 262 lb 3.2 oz (118.9 kg), currently breastfeeding. The patient appears well, alert, oriented x 3, in no distress. Normal thought process and judgement. Right Breast:  no masses noted, no fluctuant masses palpated. 10/11oclock area of erythema firm to the touch, tender to touch very mild increased warmth to the area Left Breast:  normal without suspicious masses, skin or nipple changes or axillary nodes, self-exam is taught and encouraged, no clinical evidence of mastitis observed    ASSESSMENT & PLAN  Diagnoses and all orders for this visit:    Mastitis    Other orders  -     cephALEXin (KEFLEX) 500 MG capsule;  Take 1 capsule by mouth 4 times daily for 10 days    - disc possible early mastitis given

## 2023-06-12 SDOH — ECONOMIC STABILITY: INCOME INSECURITY: HOW HARD IS IT FOR YOU TO PAY FOR THE VERY BASICS LIKE FOOD, HOUSING, MEDICAL CARE, AND HEATING?: NOT HARD AT ALL

## 2023-06-12 SDOH — ECONOMIC STABILITY: FOOD INSECURITY: WITHIN THE PAST 12 MONTHS, YOU WORRIED THAT YOUR FOOD WOULD RUN OUT BEFORE YOU GOT MONEY TO BUY MORE.: NEVER TRUE

## 2023-06-12 SDOH — ECONOMIC STABILITY: HOUSING INSECURITY
IN THE LAST 12 MONTHS, WAS THERE A TIME WHEN YOU DID NOT HAVE A STEADY PLACE TO SLEEP OR SLEPT IN A SHELTER (INCLUDING NOW)?: NO

## 2023-06-12 SDOH — ECONOMIC STABILITY: TRANSPORTATION INSECURITY
IN THE PAST 12 MONTHS, HAS LACK OF TRANSPORTATION KEPT YOU FROM MEETINGS, WORK, OR FROM GETTING THINGS NEEDED FOR DAILY LIVING?: NO

## 2023-06-12 SDOH — ECONOMIC STABILITY: FOOD INSECURITY: WITHIN THE PAST 12 MONTHS, THE FOOD YOU BOUGHT JUST DIDN'T LAST AND YOU DIDN'T HAVE MONEY TO GET MORE.: NEVER TRUE

## 2023-08-17 NOTE — PROGRESS NOTES
Patient presents today for a routine gynecological examination with no complaints. OB History          3    Para   2    Term   2       0    AB   1    Living   3         SAB   1    IAB        Ectopic        Molar        Multiple   1    Live Births   3              Last pap: 2022 EPITHELIAL CELL ABNORMALITY. ATYPICAL SQUAMOUS CELLS, CANNOT EXCLUDE HIGH-GRADE SQUAMOUS   INTRAEPITHELIAL LESION (ASC-H). Std cultures negative    2022 visit with Dr. Everette Alanis deferred due to pregnancy    GYN History           Patient's last menstrual period was 2023 (exact date). Cycle Length 28 Lasting 5  negative dysmenorrhea; negative postcoital bleeding    Past Medical History:  Past Medical History:   Diagnosis Date    Allergic rhinitis     Chlamydia infection 2018    Depression affecting pregnancy in second trimester, antepartum 2022       Past Surgical History:  Past Surgical History:   Procedure Laterality Date     SECTION       SECTION N/A 2023     SECTION performed by Saint Ginsberg, MD at [address] [place] [address] Rd EXTRACTION         Allergies:   No Known Allergies    Medication History:  Current Outpatient Medications   Medication Sig Dispense Refill    sertraline (ZOLOFT) 50 MG tablet TAKE 1 WHOLE TABLET EVERYDAY 30 tablet 11     No current facility-administered medications for this visit.        Social History:  Social History     Socioeconomic History    Marital status:      Spouse name: Not on file    Number of children: Not on file    Years of education: Not on file    Highest education level: Not on file   Occupational History    Not on file   Tobacco Use    Smoking status: Never    Smokeless tobacco: Never   Vaping Use    Vaping Use: Never used   Substance and Sexual Activity    Alcohol use: No    Drug use: No    Sexual activity: Yes     Partners: Male     Birth control/protection: None   Other Topics Concern    Not on

## 2023-08-18 ENCOUNTER — OFFICE VISIT (OUTPATIENT)
Dept: OBGYN CLINIC | Age: 23
End: 2023-08-18

## 2023-08-18 VITALS
DIASTOLIC BLOOD PRESSURE: 82 MMHG | BODY MASS INDEX: 42.85 KG/M2 | HEIGHT: 67 IN | SYSTOLIC BLOOD PRESSURE: 122 MMHG | WEIGHT: 273 LBS

## 2023-08-18 DIAGNOSIS — Z13.89 SCREENING FOR GENITOURINARY CONDITION: ICD-10-CM

## 2023-08-18 DIAGNOSIS — Z01.419 WELL WOMAN EXAM: Primary | ICD-10-CM

## 2023-08-18 DIAGNOSIS — Z12.4 SCREENING FOR CERVICAL CANCER: ICD-10-CM

## 2023-08-18 LAB
BILIRUBIN, URINE, POC: NEGATIVE
BLOOD URINE, POC: NEGATIVE
GLUCOSE URINE, POC: NEGATIVE
KETONES, URINE, POC: NEGATIVE
LEUKOCYTE ESTERASE, URINE, POC: NORMAL
NITRITE, URINE, POC: NEGATIVE
PH, URINE, POC: 5.5 (ref 4.6–8)
PROTEIN,URINE, POC: NEGATIVE
SPECIFIC GRAVITY, URINE, POC: 1.02 (ref 1–1.03)
URINALYSIS CLARITY, POC: CLEAR
URINALYSIS COLOR, POC: YELLOW
UROBILINOGEN, POC: NORMAL

## 2023-08-23 LAB
CYTOLOGIST CVX/VAG CYTO: NORMAL
CYTOLOGY CVX/VAG DOC THIN PREP: NORMAL
HPV REFLEX: NORMAL
Lab: NORMAL
PATH REPORT.FINAL DX SPEC: NORMAL
STAT OF ADQ CVX/VAG CYTO-IMP: NORMAL

## 2023-09-05 ENCOUNTER — TELEPHONE (OUTPATIENT)
Dept: OBGYN CLINIC | Age: 23
End: 2023-09-05

## 2023-09-05 NOTE — TELEPHONE ENCOUNTER
GYN patient called and LMOM stating that she was seen in the ER on 9/3/23 for mastitis. States symptoms are not improving. Attempted to contact patient. No answer. [facility] notifying patient we could see her in the office tomorrow at 8:45am.  If that does not work patient is to call back to r/s. If worsening symptoms over the evening pt to follow up at ER PRN.

## 2023-09-06 ENCOUNTER — OFFICE VISIT (OUTPATIENT)
Dept: OBGYN CLINIC | Age: 23
End: 2023-09-06
Payer: MEDICAID

## 2023-09-06 VITALS
WEIGHT: 269.1 LBS | SYSTOLIC BLOOD PRESSURE: 110 MMHG | BODY MASS INDEX: 42.24 KG/M2 | DIASTOLIC BLOOD PRESSURE: 72 MMHG | HEIGHT: 67 IN

## 2023-09-06 DIAGNOSIS — N61.0 MASTITIS: Primary | ICD-10-CM

## 2023-09-06 PROCEDURE — 99213 OFFICE O/P EST LOW 20 MIN: CPT | Performed by: NURSE PRACTITIONER

## 2023-09-06 RX ORDER — AMOXICILLIN AND CLAVULANATE POTASSIUM 500; 125 MG/1; MG/1
500 TABLET, FILM COATED ORAL EVERY 8 HOURS
COMMUNITY
Start: 2023-09-03 | End: 2023-09-06

## 2023-09-06 RX ORDER — AMOXICILLIN AND CLAVULANATE POTASSIUM 500; 125 MG/1; MG/1
TABLET, FILM COATED ORAL
COMMUNITY
Start: 2023-09-04

## 2023-10-24 NOTE — PROGRESS NOTES
The patient is a 24 y.o. [id number] who is seen for discuss anxiety. She is on Zoloft 50 mg. Patient states she thinks Zoloft needs to be increased. Having mood swings and feeling so nervous at time she will make her self sick.   + anxiety, not really depressive sx. No s/h ideation. Did well on zoloft 50mg qd, reports compliance. However recently feels like zoloft not helping and has recurrence of anxiety sx that is affecting her quality of life. She is open to starting therapy    She is breast feeding. Last period was at the end of August, this was her first PP period, lasted 7 days. Not on BC. HISTORY:  [id number]  No LMP recorded (lmp unknown). (Menstrual status: Breastfeeding). Sexual History:  has sex with males  Contraception:  none  No current outpatient medications on file prior to visit. No current facility-administered medications on file prior to visit. ROS:  Feeling well. No dyspnea or chest pain on exertion. No abdominal pain, change in bowel habits, black or bloody stools. No urinary tract symptoms. GYN ROS: she complains of worsening anxiety. PHYSICAL EXAM:  Blood pressure 124/82, height 1.702 m (5' 7\"), weight 123.5 kg (272 lb 4.8 oz), currently breastfeeding. The patient appears well, alert, oriented x 3, in no distress. Exam deferred, talk only  UPT neg    ASSESSMENT:  Encounter Diagnoses   Name Primary? Pregnancy examination or test, negative result     Amenorrhea     Anxiety Yes       PLAN:  All questions answered  Diagnosis explained in detail, including differential  Lengthy disc with pt re: continued worsening anxiety  Will inc zoloft to 100mg daily. Importance of compliance reviewed. UPT neg---irreg menses likely r/t breastfeeding, pt will continue to monitor cycles moving forward.    Refer talk therapy  Rtc 6 wk for med recheck    Orders Placed This Encounter   Procedures    Diego Wyatt MD, Psychiatry, [facility]     Referral Priority:   Routine

## 2023-10-25 ENCOUNTER — OFFICE VISIT (OUTPATIENT)
Dept: OBGYN CLINIC | Age: 23
End: 2023-10-25
Payer: MEDICAID

## 2023-10-25 VITALS
DIASTOLIC BLOOD PRESSURE: 82 MMHG | HEIGHT: 67 IN | WEIGHT: 272.3 LBS | SYSTOLIC BLOOD PRESSURE: 124 MMHG | BODY MASS INDEX: 42.74 KG/M2

## 2023-10-25 DIAGNOSIS — Z32.02 PREGNANCY EXAMINATION OR TEST, NEGATIVE RESULT: ICD-10-CM

## 2023-10-25 DIAGNOSIS — N91.2 AMENORRHEA: ICD-10-CM

## 2023-10-25 DIAGNOSIS — F41.9 ANXIETY: Primary | ICD-10-CM

## 2023-10-25 PROBLEM — O26.893 VAGINAL DISCHARGE DURING PREGNANCY IN THIRD TRIMESTER: Status: RESOLVED | Noted: 2023-01-11 | Resolved: 2023-10-25

## 2023-10-25 PROBLEM — Z87.59 HISTORY OF TWIN PREGNANCY IN PRIOR PREGNANCY: Status: RESOLVED | Noted: 2022-06-20 | Resolved: 2023-10-25

## 2023-10-25 PROBLEM — O99.210 OBESITY AFFECTING PREGNANCY: Status: RESOLVED | Noted: 2022-06-20 | Resolved: 2023-10-25

## 2023-10-25 PROBLEM — O99.342 DEPRESSION AFFECTING PREGNANCY IN SECOND TRIMESTER, ANTEPARTUM: Status: RESOLVED | Noted: 2022-09-12 | Resolved: 2023-10-25

## 2023-10-25 PROBLEM — N89.8 VAGINAL DISCHARGE DURING PREGNANCY IN THIRD TRIMESTER: Status: RESOLVED | Noted: 2023-01-11 | Resolved: 2023-10-25

## 2023-10-25 PROBLEM — O26.899 ABDOMINAL PAIN AFFECTING PREGNANCY: Status: RESOLVED | Noted: 2022-11-11 | Resolved: 2023-10-25

## 2023-10-25 PROBLEM — F32.A DEPRESSION AFFECTING PREGNANCY IN SECOND TRIMESTER, ANTEPARTUM: Status: RESOLVED | Noted: 2022-09-12 | Resolved: 2023-10-25

## 2023-10-25 PROBLEM — R10.9 ABDOMINAL PAIN AFFECTING PREGNANCY: Status: RESOLVED | Noted: 2022-11-11 | Resolved: 2023-10-25

## 2023-10-25 PROBLEM — O09.92 HIGH-RISK PREGNANCY IN SECOND TRIMESTER: Status: RESOLVED | Noted: 2022-06-20 | Resolved: 2023-10-25

## 2023-10-25 LAB
HCG, PREGNANCY, URINE, POC: NEGATIVE
VALID INTERNAL CONTROL, POC: YES

## 2023-10-25 PROCEDURE — 99214 OFFICE O/P EST MOD 30 MIN: CPT | Performed by: NURSE PRACTITIONER

## 2023-10-25 PROCEDURE — 81025 URINE PREGNANCY TEST: CPT | Performed by: NURSE PRACTITIONER

## 2023-10-25 RX ORDER — SERTRALINE HYDROCHLORIDE 100 MG/1
100 TABLET, FILM COATED ORAL DAILY
Qty: 30 TABLET | Refills: 5 | Status: SHIPPED | OUTPATIENT
Start: 2023-10-25

## 2023-11-28 ENCOUNTER — OFFICE VISIT (OUTPATIENT)
Dept: OBGYN CLINIC | Age: 23
End: 2023-11-28
Payer: MEDICAID

## 2023-11-28 VITALS
SYSTOLIC BLOOD PRESSURE: 108 MMHG | WEIGHT: 272.8 LBS | HEIGHT: 67 IN | DIASTOLIC BLOOD PRESSURE: 72 MMHG | BODY MASS INDEX: 42.82 KG/M2

## 2023-11-28 DIAGNOSIS — F41.8 DEPRESSION WITH ANXIETY: Primary | ICD-10-CM

## 2023-11-28 DIAGNOSIS — R53.83 OTHER FATIGUE: ICD-10-CM

## 2023-11-28 DIAGNOSIS — Z13.29 SCREENING FOR THYROID DISORDER: ICD-10-CM

## 2023-11-28 DIAGNOSIS — Z32.00 ENCOUNTER FOR PREGNANCY TEST, RESULT UNKNOWN: ICD-10-CM

## 2023-11-28 DIAGNOSIS — Z13.21 ENCOUNTER FOR VITAMIN DEFICIENCY SCREENING: ICD-10-CM

## 2023-11-28 DIAGNOSIS — Z30.09 ENCOUNTER FOR GENERAL COUNSELING AND ADVICE ON CONTRACEPTIVE MANAGEMENT: ICD-10-CM

## 2023-11-28 DIAGNOSIS — Z79.899 MEDICATION MANAGEMENT: ICD-10-CM

## 2023-11-28 LAB
25(OH)D3 SERPL-MCNC: 19 NG/ML (ref 30–100)
HCG, PREGNANCY, URINE, POC: NEGATIVE
T4 FREE SERPL-MCNC: 0.9 NG/DL (ref 0.78–1.46)
TSH, 3RD GENERATION: 1.13 UIU/ML (ref 0.36–3.74)
VALID INTERNAL CONTROL, POC: YES

## 2023-11-28 PROCEDURE — 99214 OFFICE O/P EST MOD 30 MIN: CPT | Performed by: NURSE PRACTITIONER

## 2023-11-28 PROCEDURE — 81025 URINE PREGNANCY TEST: CPT | Performed by: NURSE PRACTITIONER

## 2023-11-28 RX ORDER — NORETHINDRONE ACETATE AND ETHINYL ESTRADIOL 1.5-30(21)
1 KIT ORAL DAILY
Qty: 3 PACKET | Refills: 3 | Status: SHIPPED | OUTPATIENT
Start: 2023-11-28

## 2023-11-28 NOTE — PROGRESS NOTES
The patient is a 24 y.o. [id number] who is seen for medication recheck on Zoloft 100mg that was prescribed on 10/25/23. Patient states she is doing well but has been having issues with fatigue. She used to take in the mornings but is now having to take in the afternoon. Since starting she has had RSV and pneumonia and not sure if fatigue is related. Fatigue prominent for the past few weeks and started weeks after increasing zoloft. Moods are improved on Zoloft  100mg daily and is overall happy with it. Pt is sexually active and does not currently use anything for contraception. She is breastfeeding but states supply is drying up. LMP 10/23/23. UPT in office today was negative.  +monthly periods just a longer cycle interval, q32d. She would like to start OCP. HISTORY:    [id number]  Patient's last menstrual period was 10/23/2023 (approximate). Sexual History:  has sex with males  Contraception:  none  Current Outpatient Medications on File Prior to Visit   Medication Sig Dispense Refill    sertraline (ZOLOFT) 100 MG tablet Take 1 tablet by mouth daily 30 tablet 5     No current facility-administered medications on file prior to visit. ROS:  Feeling well. No dyspnea or chest pain on exertion. No abdominal pain, change in bowel habits, black or bloody stools. No urinary tract symptoms. GYN ROS: normal menses, no abnormal bleeding, pelvic pain or discharge, no breast pain or new or enlarging lumps on self exam.    PHYSICAL EXAM:  Blood pressure 108/72, height 1.702 m (5' 7\"), weight 123.7 kg (272 lb 12.8 oz), last menstrual period 10/23/2023, currently breastfeeding. The patient appears well, alert, oriented x 3, in no distress. Exam deferred, talk only    ASSESSMENT:  Encounter Diagnoses   Name Primary?     Encounter for pregnancy test, result unknown     Encounter for vitamin deficiency screening     Screening for thyroid disorder     Encounter for general counseling and advice on contraceptive

## 2023-11-29 ENCOUNTER — TELEPHONE (OUTPATIENT)
Dept: OBGYN CLINIC | Age: 23
End: 2023-11-29

## 2023-11-29 RX ORDER — ERGOCALCIFEROL 1.25 MG/1
50000 CAPSULE ORAL WEEKLY
Qty: 4 CAPSULE | Refills: 1 | Status: SHIPPED | OUTPATIENT
Start: 2023-11-29

## 2023-11-29 NOTE — TELEPHONE ENCOUNTER
----- Message from KEIRY Walker CNP sent at 11/29/2023 11:50 AM EST -----  Thyroid wnl  Vit d low  Send rx vit d 50,000 iu qwk #4 1 RF then OTC vit D 2000iu daily.

## 2023-11-29 NOTE — TELEPHONE ENCOUNTER
[employer]le[employer] message sent to patient. Prescription sent to pharmacy.     Orders Placed This Encounter    vitamin D (ERGOCALCIFEROL) 1.25 MG (38682 UT) CAPS capsule     Sig: Take 1 capsule by mouth once a week     Dispense:  4 capsule     Refill:  1

## 2024-11-03 NOTE — H&P
History & Physical    Name: Radha Tracy MRN: [id number]  SSN: [id number]    YOB: 2000  Age: 20 y.o. Sex: female      Subjective:     Reason for Triage visit:  33w3d and cramping    History of Present Illness: Ms. Rome Taylor is a 20 y.o.  female with an estimated gestational age of 27w4d with Estimated Date of Delivery: 23. Patient states that she noted some ponk spotting yesterday and some cramping all day. Went to [facility] ER and was sent here for evaluation. Pregnancy has been  complicated by hx of twins, and hx of miscarriage, elevated BP in first trimester. No headache, no BV. No dysuria, or stining with urination. No vomiting. Patient denies chest pain, fever, headache , nausea and vomiting, pelvic pressure, right upper quadrant pain  , shortness of breath, swelling, vaginal leaking of fluid , and visual disturbances.     OB History    Para Term  AB Living   3 1 1 0 1 2   SAB IAB Ectopic Molar Multiple Live Births   1       1 2      # Outcome Date GA Lbr Aleks/2nd Weight Sex Delivery Anes PTL Lv   3 Current            2A Term 18 38w0d  7 lb 4.9 oz (3.315 kg) F CS-LTranv  N TRACIE   2B Term 18 38w0d  6 lb 13.9 oz (3.115 kg) M CS-LTranv  N TRACIE   1 SAB              Past Medical History:   Diagnosis Date    Allergic rhinitis     Chlamydia infection 2018    Depression affecting pregnancy in second trimester, antepartum 2022     Past Surgical History:   Procedure Laterality Date     SECTION      TONSILLECTOMY      WISDOM TOOTH EXTRACTION       Social History     Occupational History    Not on file   Tobacco Use    Smoking status: Never    Smokeless tobacco: Never   Vaping Use    Vaping Use: Never used   Substance and Sexual Activity    Alcohol use: No    Drug use: No    Sexual activity: Yes     Partners: Male     Birth control/protection: None      Family History   Problem Relation Age of Onset    Prostate Cancer Maternal Grandfather     COPD Patient asleep. No overnight events.    MEDICATIONS  (STANDING):  abiraterone 500 mg tablet 2 Tablet(s) 2 Tablet(s) Oral daily  acetaminophen     Tablet .. 650 milliGRAM(s) Oral once  atorvastatin 20 milliGRAM(s) Oral at bedtime  cefepime   IVPB 2000 milliGRAM(s) IV Intermittent every 8 hours  cefepime   IVPB      chlorhexidine 2% Cloths 1 Application(s) Topical daily  dextrose 5%. 1000 milliLiter(s) (50 mL/Hr) IV Continuous <Continuous>  dextrose 5%. 1000 milliLiter(s) (100 mL/Hr) IV Continuous <Continuous>  dextrose 50% Injectable 12.5 Gram(s) IV Push once  dextrose 50% Injectable 25 Gram(s) IV Push once  dextrose 50% Injectable 25 Gram(s) IV Push once  diphenhydrAMINE 25 milliGRAM(s) Oral once  fenofibrate Tablet 145 milliGRAM(s) Oral daily  FIRST- Mouthwash  BLM 30 milliLiter(s) Swish and Spit three times a day  glucagon  Injectable 1 milliGRAM(s) IntraMuscular once  haloperidol     Tablet 1 milliGRAM(s) Oral <User Schedule>  influenza  Vaccine (HIGH DOSE) 0.5 milliLiter(s) IntraMuscular once  insulin lispro (ADMELOG) corrective regimen sliding scale   SubCutaneous three times a day before meals  insulin lispro (ADMELOG) corrective regimen sliding scale   SubCutaneous at bedtime  lactobacillus acidophilus 1 Tablet(s) Oral daily  lidocaine   4% Patch 1 Patch Transdermal every 24 hours  melatonin 6 milliGRAM(s) Oral at bedtime  metroNIDAZOLE  IVPB 500 milliGRAM(s) IV Intermittent every 12 hours  multivitamin 1 Tablet(s) Oral daily  Nephro-nancy 1 Tablet(s) Oral daily  pantoprazole  Injectable 40 milliGRAM(s) IV Push daily  potassium phosphate IVPB 30 milliMole(s) IV Intermittent once  predniSONE   Tablet 5 milliGRAM(s) Oral daily    MEDICATIONS  (PRN):  acetaminophen     Tablet .. 650 milliGRAM(s) Oral every 6 hours PRN Temp greater or equal to 38C (100.4F), Mild Pain (1 - 3), Moderate Pain (4 - 6)  aluminum hydroxide/magnesium hydroxide/simethicone Suspension 30 milliLiter(s) Oral every 4 hours PRN Dyspepsia  artificial tears (preservative free) Ophthalmic Solution 1 Drop(s) Both EYES four times a day PRN Dry Eyes  dextrose Oral Gel 15 Gram(s) Oral once PRN Blood Glucose LESS THAN 70 milliGRAM(s)/deciliter  morphine  - Injectable 4 milliGRAM(s) IV Push every 3 hours PRN Severe Pain (7 - 10)  morphine  - Injectable 2 milliGRAM(s) IV Push every 4 hours PRN Moderate Pain (4 - 6)  naloxone Injectable 0.1 milliGRAM(s) IV Push every 3 minutes PRN For ANY of the following changes in patient status:  A. RR LESS THAN 10 breaths per minute, B. Oxygen saturation LESS THAN 90%, C. Sedation score of 6  polyethylene glycol 3350 17 Gram(s) Oral daily PRN Constipation    Vital Signs Last 24 Hrs  T(C): 36.7 (03 Nov 2024 05:00), Max: 37.2 (03 Nov 2024 01:00)  T(F): 98.1 (03 Nov 2024 05:00), Max: 98.9 (03 Nov 2024 01:00)  HR: 103 (03 Nov 2024 05:00) (102 - 107)  BP: 111/77 (03 Nov 2024 05:00) (104/62 - 117/76)  BP(mean): --  RR: 18 (03 Nov 2024 05:00) (17 - 18)  SpO2: 97% (03 Nov 2024 05:00) (97% - 100%)    Parameters below as of 03 Nov 2024 05:00  Patient On (Oxygen Delivery Method): room air        PE  NAD  Awake, alert  Anicteric, MMM  RRR  CTAB  Abd soft, NT, ND  No c/c/e  No rash grossly                                     7.6    2.63  )-----------( 149      ( 03 Nov 2024 06:50 )             23.6   11-03    151[H]  |  123[H]  |  8   ----------------------------<  133[H]  3.4[L]   |  20[L]  |  0.34[L]    Ca    7.4[L]      03 Nov 2024 06:50  Phos  2.5     11-03  Mg     1.80     11-03         Maternal Grandmother     Bleeding Prob Maternal Grandmother     No Known Problems Father     Other Mother         scoliosis       No Known Allergies  Prior to Admission medications    Medication Sig Start Date End Date Taking? Authorizing Provider   aspirin-acetaminophen-caffeine (EXCEDRIN MIGRAINE) [phone] MG per tablet Take 1 tablet by mouth every 6 hours as needed for Headaches    Historical Provider, MD   sertraline (ZOLOFT) 50 MG tablet Take 1/2 pill everyday for first 6 days then take 1 whole pill everyday thereafter. 9/12/22   Shahid Rodriguez MD   aspirin 81 MG EC tablet Take 81 mg by mouth in the morning. Historical Provider, MD   Cholecalciferol (VITAMIN D3) 50 MCG (2000 UT) CAPS Take 2,000 Units by mouth in the morning. Historical Provider, MD   Prenatal-FeFum-FA-DHA w/o A (PRENATAL + DHA PO) Take by mouth    Historical Provider, MD        Review of Systems:  Complete review of systems performed. Those not specifically mentioned in the HPI are either negative are non related to this patient encounter. Objective:     Vitals:    Vitals:    12/05/22 2217   Weight: 277 lb (125.6 kg)   Height: 5' 7\" (1.702 m)      No data recorded. No data recorded       Physical Exam:  Heart: RRR  Lungs: cta bl  Adb: soft, nt nd, gravid  Ext: no edema noted  CVX: closed thick. Membranes:  Intact  Uterine Activity:  None  Fetal Heart Rate:  Reactive       Lab/Data Review:  No results found for this or any previous visit (from the past 24 hour(s)). Assessment and Plan:   33w3d with initial complaints of pink discharge, cramping. Will rehydrate and reassess but likely discomforts of prengnancy      Labor precautions given. Patient was told to return to LD immediately if she experiences contractions in a pattern, loss of fluids, vaginal bleeding or decreased Fetal movement. Patient more awake, states his pain has improved. No overnight events.    MEDICATIONS  (STANDING):  abiraterone 500 mg tablet 2 Tablet(s) 2 Tablet(s) Oral daily  acetaminophen     Tablet .. 650 milliGRAM(s) Oral once  atorvastatin 20 milliGRAM(s) Oral at bedtime  cefepime   IVPB 2000 milliGRAM(s) IV Intermittent every 8 hours  cefepime   IVPB      chlorhexidine 2% Cloths 1 Application(s) Topical daily  dextrose 5%. 1000 milliLiter(s) (50 mL/Hr) IV Continuous <Continuous>  dextrose 5%. 1000 milliLiter(s) (100 mL/Hr) IV Continuous <Continuous>  dextrose 50% Injectable 12.5 Gram(s) IV Push once  dextrose 50% Injectable 25 Gram(s) IV Push once  dextrose 50% Injectable 25 Gram(s) IV Push once  diphenhydrAMINE 25 milliGRAM(s) Oral once  fenofibrate Tablet 145 milliGRAM(s) Oral daily  FIRST- Mouthwash  BLM 30 milliLiter(s) Swish and Spit three times a day  glucagon  Injectable 1 milliGRAM(s) IntraMuscular once  haloperidol     Tablet 1 milliGRAM(s) Oral <User Schedule>  influenza  Vaccine (HIGH DOSE) 0.5 milliLiter(s) IntraMuscular once  insulin lispro (ADMELOG) corrective regimen sliding scale   SubCutaneous three times a day before meals  insulin lispro (ADMELOG) corrective regimen sliding scale   SubCutaneous at bedtime  lactobacillus acidophilus 1 Tablet(s) Oral daily  lidocaine   4% Patch 1 Patch Transdermal every 24 hours  melatonin 6 milliGRAM(s) Oral at bedtime  metroNIDAZOLE  IVPB 500 milliGRAM(s) IV Intermittent every 12 hours  multivitamin 1 Tablet(s) Oral daily  Nephro-nancy 1 Tablet(s) Oral daily  pantoprazole  Injectable 40 milliGRAM(s) IV Push daily  potassium phosphate IVPB 30 milliMole(s) IV Intermittent once  predniSONE   Tablet 5 milliGRAM(s) Oral daily    MEDICATIONS  (PRN):  acetaminophen     Tablet .. 650 milliGRAM(s) Oral every 6 hours PRN Temp greater or equal to 38C (100.4F), Mild Pain (1 - 3), Moderate Pain (4 - 6)  aluminum hydroxide/magnesium hydroxide/simethicone Suspension 30 milliLiter(s) Oral every 4 hours PRN Dyspepsia  artificial tears (preservative free) Ophthalmic Solution 1 Drop(s) Both EYES four times a day PRN Dry Eyes  dextrose Oral Gel 15 Gram(s) Oral once PRN Blood Glucose LESS THAN 70 milliGRAM(s)/deciliter  morphine  - Injectable 4 milliGRAM(s) IV Push every 3 hours PRN Severe Pain (7 - 10)  morphine  - Injectable 2 milliGRAM(s) IV Push every 4 hours PRN Moderate Pain (4 - 6)  naloxone Injectable 0.1 milliGRAM(s) IV Push every 3 minutes PRN For ANY of the following changes in patient status:  A. RR LESS THAN 10 breaths per minute, B. Oxygen saturation LESS THAN 90%, C. Sedation score of 6  polyethylene glycol 3350 17 Gram(s) Oral daily PRN Constipation    Vital Signs Last 24 Hrs  T(C): 36.7 (03 Nov 2024 05:00), Max: 37.2 (03 Nov 2024 01:00)  T(F): 98.1 (03 Nov 2024 05:00), Max: 98.9 (03 Nov 2024 01:00)  HR: 103 (03 Nov 2024 05:00) (102 - 107)  BP: 111/77 (03 Nov 2024 05:00) (104/62 - 117/76)  BP(mean): --  RR: 18 (03 Nov 2024 05:00) (17 - 18)  SpO2: 97% (03 Nov 2024 05:00) (97% - 100%)    Parameters below as of 03 Nov 2024 05:00  Patient On (Oxygen Delivery Method): room air        PE  NAD  Awake, alert  Anicteric, MMM  RRR  CTAB  Abd soft, NT, ND  No c/c/e  No rash grossly                                     7.6    2.63  )-----------( 149      ( 03 Nov 2024 06:50 )             23.6   11-03    151[H]  |  123[H]  |  8   ----------------------------<  133[H]  3.4[L]   |  20[L]  |  0.34[L]    Ca    7.4[L]      03 Nov 2024 06:50  Phos  2.5     11-03  Mg     1.80     11-03

## (undated) DEVICE — PENCIL SMK EVAC TELSCP 4.5 M TBNG

## (undated) DEVICE — SUTURE CHROMIC GUT SZ 1 L36IN ABSRB BRN L48MM CTX 1/2 CIR 905H

## (undated) DEVICE — PENCIL ES L3M BTTN SWCH S STL HEX LOK BLDE ELECTRD HOLSTER

## (undated) DEVICE — KENDALL SCD EXPRESS SLEEVES, KNEE LENGTH, MEDIUM: Brand: KENDALL SCD

## (undated) DEVICE — STERILE POLYISOPRENE POWDER-FREE SURGICAL GLOVES: Brand: PROTEXIS

## (undated) DEVICE — APPLICATOR BNDG 1MM ADH PREMIERPRO EXOFIN

## (undated) DEVICE — Device

## (undated) DEVICE — SUTURE 2-0 L36IN ABSRB BRN CT L40MM 1/2 CIR TAPERPOINT SGL 913H

## (undated) DEVICE — AMD ANTIMICROBIAL GAUZE SPONGES,12 PLY USP TYPE VII, 0.2% POLYHEXAMETHYLENE BIGUANIDE HCI (PHMB): Brand: CURITY

## (undated) DEVICE — SUTURE ABSORBABLE ANTIBACT 1-0 CT-1 24 IN STRATAFIX PDS + SXPP1A443

## (undated) DEVICE — ELECTRODE PT RET AD L9FT HI MOIST COND ADH HYDRGEL CORDED

## (undated) DEVICE — SURGICAL PROCEDURE PACK C SECT CDS

## (undated) DEVICE — SOLUTION IV 1000ML 0.9% SOD CHL

## (undated) DEVICE — 3M™ STERI-STRIP™ REINFORCED ADHESIVE SKIN CLOSURES, R1547, 1/2 IN X 4 IN (12 MM X 100 MM), 6 STRIPS/ENVELOPE: Brand: 3M™ STERI-STRIP™

## (undated) DEVICE — TUBING, SUCTION, 1/4" X 10', STRAIGHT: Brand: MEDLINE

## (undated) DEVICE — SUTURE PLN GUT SZ 2-0 L27IN ABSRB YELLOWISH TAN L40MM CT 853H

## (undated) DEVICE — APPLICATOR MEDICATED 26 CC SOLUTION HI LT ORNG CHLORAPREP

## (undated) DEVICE — SOLUTION IRRIG 1000ML H2O STRL BLT

## (undated) DEVICE — Device: Brand: PORTEX

## (undated) DEVICE — AMD ANTIMICROBIAL NON-ADHERENT PAD,0.2% POLYHEXAMETHYLENE BIGUANIDE HCI (PHMB): Brand: TELFA

## (undated) DEVICE — SUTURE VCRL SZ 4-0 L27IN ABSRB UD L60MM KS STR REV CUT NDL J662H